# Patient Record
Sex: MALE | Race: OTHER | HISPANIC OR LATINO
[De-identification: names, ages, dates, MRNs, and addresses within clinical notes are randomized per-mention and may not be internally consistent; named-entity substitution may affect disease eponyms.]

---

## 2017-01-26 ENCOUNTER — APPOINTMENT (OUTPATIENT)
Dept: ORTHOPEDIC SURGERY | Facility: CLINIC | Age: 57
End: 2017-01-26

## 2017-01-26 VITALS — BODY MASS INDEX: 29.57 KG/M2 | HEIGHT: 66 IN | WEIGHT: 184 LBS

## 2017-01-26 DIAGNOSIS — M75.41 IMPINGEMENT SYNDROME OF RIGHT SHOULDER: ICD-10-CM

## 2017-01-26 DIAGNOSIS — M75.42 IMPINGEMENT SYNDROME OF LEFT SHOULDER: ICD-10-CM

## 2017-03-31 ENCOUNTER — RX RENEWAL (OUTPATIENT)
Age: 57
End: 2017-03-31

## 2017-06-19 ENCOUNTER — RX RENEWAL (OUTPATIENT)
Age: 57
End: 2017-06-19

## 2017-06-20 ENCOUNTER — RX RENEWAL (OUTPATIENT)
Age: 57
End: 2017-06-20

## 2017-08-29 ENCOUNTER — RX RENEWAL (OUTPATIENT)
Age: 57
End: 2017-08-29

## 2017-09-27 ENCOUNTER — RX RENEWAL (OUTPATIENT)
Age: 57
End: 2017-09-27

## 2017-10-02 ENCOUNTER — RX RENEWAL (OUTPATIENT)
Age: 57
End: 2017-10-02

## 2017-10-30 ENCOUNTER — RX RENEWAL (OUTPATIENT)
Age: 57
End: 2017-10-30

## 2018-01-23 ENCOUNTER — RX RENEWAL (OUTPATIENT)
Age: 58
End: 2018-01-23

## 2018-02-01 ENCOUNTER — OUTPATIENT (OUTPATIENT)
Dept: OUTPATIENT SERVICES | Facility: HOSPITAL | Age: 58
LOS: 1 days | End: 2018-02-01
Payer: MEDICAID

## 2018-02-01 ENCOUNTER — APPOINTMENT (OUTPATIENT)
Dept: INTERNAL MEDICINE | Facility: CLINIC | Age: 58
End: 2018-02-01
Payer: MEDICAID

## 2018-02-01 VITALS
HEART RATE: 116 BPM | HEIGHT: 66 IN | SYSTOLIC BLOOD PRESSURE: 140 MMHG | BODY MASS INDEX: 27.32 KG/M2 | WEIGHT: 170 LBS | DIASTOLIC BLOOD PRESSURE: 70 MMHG | OXYGEN SATURATION: 98 %

## 2018-02-01 VITALS — HEART RATE: 92 BPM

## 2018-02-01 DIAGNOSIS — I10 ESSENTIAL (PRIMARY) HYPERTENSION: ICD-10-CM

## 2018-02-01 DIAGNOSIS — D64.9 ANEMIA, UNSPECIFIED: ICD-10-CM

## 2018-02-01 PROCEDURE — 99396 PREV VISIT EST AGE 40-64: CPT

## 2018-02-01 PROCEDURE — G0463: CPT

## 2018-02-02 LAB
25(OH)D3 SERPL-MCNC: 24 NG/ML
ALBUMIN SERPL ELPH-MCNC: 4.7 G/DL
ALP BLD-CCNC: 93 U/L
ALT SERPL-CCNC: 23 U/L
ANION GAP SERPL CALC-SCNC: 16 MMOL/L
AST SERPL-CCNC: 23 U/L
BASOPHILS # BLD AUTO: 0.02 K/UL
BASOPHILS NFR BLD AUTO: 0.3 %
BILIRUB SERPL-MCNC: 0.3 MG/DL
BUN SERPL-MCNC: 22 MG/DL
CALCIUM SERPL-MCNC: 10.2 MG/DL
CHLORIDE SERPL-SCNC: 99 MMOL/L
CHOLEST SERPL-MCNC: 183 MG/DL
CHOLEST/HDLC SERPL: 2.1 RATIO
CO2 SERPL-SCNC: 23 MMOL/L
CREAT SERPL-MCNC: 0.9 MG/DL
EOSINOPHIL # BLD AUTO: 0.19 K/UL
EOSINOPHIL NFR BLD AUTO: 3.2 %
FOLATE SERPL-MCNC: 11.1 NG/ML
GLUCOSE SERPL-MCNC: 128 MG/DL
HBA1C MFR BLD HPLC: 5.5 %
HCT VFR BLD CALC: 39 %
HDLC SERPL-MCNC: 86 MG/DL
HGB BLD-MCNC: 12.5 G/DL
HIV1+2 AB SPEC QL IA.RAPID: NONREACTIVE
IMM GRANULOCYTES NFR BLD AUTO: 0.2 %
IRON SATN MFR SERPL: 32 %
IRON SERPL-MCNC: 108 UG/DL
LDLC SERPL CALC-MCNC: 79 MG/DL
LYMPHOCYTES # BLD AUTO: 1.17 K/UL
LYMPHOCYTES NFR BLD AUTO: 19.6 %
MAN DIFF?: NORMAL
MCHC RBC-ENTMCNC: 28.7 PG
MCHC RBC-ENTMCNC: 32.1 GM/DL
MCV RBC AUTO: 89.7 FL
MONOCYTES # BLD AUTO: 0.51 K/UL
MONOCYTES NFR BLD AUTO: 8.5 %
NEUTROPHILS # BLD AUTO: 4.08 K/UL
NEUTROPHILS NFR BLD AUTO: 68.2 %
PLATELET # BLD AUTO: 209 K/UL
POTASSIUM SERPL-SCNC: 4.5 MMOL/L
PROT SERPL-MCNC: 7.8 G/DL
RBC # BLD: 4.35 M/UL
RBC # FLD: 13.1 %
SODIUM SERPL-SCNC: 138 MMOL/L
T PALLIDUM AB SER QL IA: NEGATIVE
TIBC SERPL-MCNC: 336 UG/DL
TRIGL SERPL-MCNC: 92 MG/DL
TSH SERPL-ACNC: 1.32 UIU/ML
UIBC SERPL-MCNC: 228 UG/DL
VIT B12 SERPL-MCNC: 369 PG/ML
WBC # FLD AUTO: 5.98 K/UL

## 2018-02-12 DIAGNOSIS — D64.9 ANEMIA, UNSPECIFIED: ICD-10-CM

## 2018-04-17 ENCOUNTER — RX RENEWAL (OUTPATIENT)
Age: 58
End: 2018-04-17

## 2018-04-30 ENCOUNTER — APPOINTMENT (OUTPATIENT)
Dept: PULMONOLOGY | Facility: CLINIC | Age: 58
End: 2018-04-30
Payer: MEDICAID

## 2018-04-30 VITALS
TEMPERATURE: 98.4 F | HEIGHT: 66 IN | HEART RATE: 85 BPM | OXYGEN SATURATION: 97 % | WEIGHT: 169 LBS | BODY MASS INDEX: 27.16 KG/M2 | RESPIRATION RATE: 16 BRPM | SYSTOLIC BLOOD PRESSURE: 120 MMHG | DIASTOLIC BLOOD PRESSURE: 79 MMHG

## 2018-04-30 PROCEDURE — 99204 OFFICE O/P NEW MOD 45 MIN: CPT | Mod: GC

## 2018-05-16 ENCOUNTER — OUTPATIENT (OUTPATIENT)
Dept: OUTPATIENT SERVICES | Facility: HOSPITAL | Age: 58
LOS: 1 days | End: 2018-05-16
Payer: MEDICAID

## 2018-05-16 ENCOUNTER — APPOINTMENT (OUTPATIENT)
Dept: SLEEP CENTER | Facility: CLINIC | Age: 58
End: 2018-05-16
Payer: MEDICAID

## 2018-05-16 PROCEDURE — 95806 SLEEP STUDY UNATT&RESP EFFT: CPT | Mod: 26

## 2018-05-16 PROCEDURE — 95806 SLEEP STUDY UNATT&RESP EFFT: CPT

## 2018-05-18 ENCOUNTER — RESULT REVIEW (OUTPATIENT)
Age: 58
End: 2018-05-18

## 2018-05-18 DIAGNOSIS — G47.33 OBSTRUCTIVE SLEEP APNEA (ADULT) (PEDIATRIC): ICD-10-CM

## 2018-05-29 ENCOUNTER — RESULT REVIEW (OUTPATIENT)
Age: 58
End: 2018-05-29

## 2018-06-18 ENCOUNTER — OUTPATIENT (OUTPATIENT)
Dept: OUTPATIENT SERVICES | Facility: HOSPITAL | Age: 58
LOS: 1 days | End: 2018-06-18
Payer: MEDICAID

## 2018-06-18 ENCOUNTER — APPOINTMENT (OUTPATIENT)
Dept: INTERNAL MEDICINE | Facility: CLINIC | Age: 58
End: 2018-06-18
Payer: MEDICAID

## 2018-06-18 ENCOUNTER — LABORATORY RESULT (OUTPATIENT)
Age: 58
End: 2018-06-18

## 2018-06-18 VITALS
WEIGHT: 170 LBS | BODY MASS INDEX: 27.32 KG/M2 | HEART RATE: 96 BPM | DIASTOLIC BLOOD PRESSURE: 90 MMHG | SYSTOLIC BLOOD PRESSURE: 130 MMHG | HEIGHT: 66 IN

## 2018-06-18 DIAGNOSIS — I10 ESSENTIAL (PRIMARY) HYPERTENSION: ICD-10-CM

## 2018-06-18 LAB — HBA1C BLD-MCNC: 5.7 % — HIGH (ref 4–5.6)

## 2018-06-18 PROCEDURE — G0463: CPT

## 2018-06-18 PROCEDURE — 82043 UR ALBUMIN QUANTITATIVE: CPT

## 2018-06-18 PROCEDURE — 99213 OFFICE O/P EST LOW 20 MIN: CPT | Mod: GE

## 2018-06-18 PROCEDURE — 83036 HEMOGLOBIN GLYCOSYLATED A1C: CPT

## 2018-06-18 NOTE — ASSESSMENT
[FreeTextEntry1] : 58yo M with history of HTN, T2DM, newly dx RADHA, presents for diabetes follow up. \par \par DMT2 - Last HgA1c at 5.5 in 2/2018.\par - f/u A1c today. Discussed discontinuing metformin if A1c continues to decrease.\par - cont metformin 500 BID for now\par - completed diabetic foot exam today - nml monofilament test\par - f/u annual microalbuminuria\par - will get annual eye exam\par \par RADHA - pending f/u APAP titration at home\par \par HTN\par - BP well controlled.\par - cont Lisinopril 5mg qd\par \par HLD \par - start atorvastatin 40mg qd as pt with ASCVD score of 10%\par - counseled on regular exercise for 30m every 3-5days\par - encouraged continued weight loss through healthy eating\par \par HCM \par colonoscopy neg in  2/2014\par Depression screen - PHQ2 0\par imm- tdap 2015, pneumovax 2015\par \par f/u in 3 months \par d/w Dr Sarkar\par \par

## 2018-06-18 NOTE — REVIEW OF SYSTEMS
[Fever] : no fever [Chills] : no chills [Fatigue] : no fatigue [Chest Pain] : no chest pain [Palpitations] : no palpitations [Lower Ext Edema] : no lower extremity edema [Shortness Of Breath] : no shortness of breath [Wheezing] : no wheezing [Dyspnea on Exertion] : not dyspnea on exertion [Abdominal Pain] : no abdominal pain [Nausea] : no nausea [Constipation] : no constipation [Diarrhea] : no diarrhea [Dysuria] : no dysuria [Incontinence] : no incontinence [Joint Pain] : no joint pain [Joint Stiffness] : no joint stiffness [Muscle Pain] : no muscle pain [Back Pain] : no back pain [Joint Swelling] : no joint swelling [Headache] : no headache [Dizziness] : no dizziness [Unsteady Walk] : no ataxia

## 2018-06-18 NOTE — PHYSICAL EXAM
[No Acute Distress] : no acute distress [Well Nourished] : well nourished [Well Developed] : well developed [Well-Appearing] : well-appearing [Normal Sclera/Conjunctiva] : normal sclera/conjunctiva [PERRL] : pupils equal round and reactive to light [EOMI] : extraocular movements intact [No JVD] : no jugular venous distention [Supple] : supple [No Lymphadenopathy] : no lymphadenopathy [Thyroid Normal, No Nodules] : the thyroid was normal and there were no nodules present [No Respiratory Distress] : no respiratory distress  [Clear to Auscultation] : lungs were clear to auscultation bilaterally [No Accessory Muscle Use] : no accessory muscle use [Normal Rate] : normal rate  [Regular Rhythm] : with a regular rhythm [Normal S1, S2] : normal S1 and S2 [No Murmur] : no murmur heard [Pedal Pulses Present] : the pedal pulses are present [No Edema] : there was no peripheral edema [No Extremity Clubbing/Cyanosis] : no extremity clubbing/cyanosis [Soft] : abdomen soft [Non Tender] : non-tender [Non-distended] : non-distended [No Masses] : no abdominal mass palpated [No HSM] : no HSM [Normal Bowel Sounds] : normal bowel sounds [Normal Supraclavicular Nodes] : no supraclavicular lymphadenopathy [Normal Posterior Cervical Nodes] : no posterior cervical lymphadenopathy [Normal Anterior Cervical Nodes] : no anterior cervical lymphadenopathy [No CVA Tenderness] : no CVA  tenderness [No Spinal Tenderness] : no spinal tenderness [No Joint Swelling] : no joint swelling [Grossly Normal Strength/Tone] : grossly normal strength/tone [No Rash] : no rash [Normal Gait] : normal gait [Coordination Grossly Intact] : coordination grossly intact [No Focal Deficits] : no focal deficits [Normal Affect] : the affect was normal [Normal Insight/Judgement] : insight and judgment were intact [Comprehensive Foot Exam Normal] : Right and left foot were examined and both feet are normal. No ulcers in either foot. Toes are normal and with full ROM.  Normal tactile sensation with monofilament testing throughout both feet

## 2018-06-18 NOTE — HISTORY OF PRESENT ILLNESS
[FreeTextEntry1] : diabetes follow up [de-identified] : 56yo M with history of HTN, T2DM, newly dx RADHA, presents for diabetes follow up. \par \par DMT2 - Tolerating metformin 500 BID. Last A1c in 2/2018 at 5.5. Reports continued healthy eating, limiting carbohydrates and low fat diet. Has lost 15 lbs over past 1.5 yr. Reports exercising 30-60m/d. Reports feeling at a healthy weight. No sx of dysuria, dizziness, n/v. Reports regular annual eye exams. \par RADHA - Recently had sleep study and dx with RADHA, pending f/u APAP titration at home. \par HTN - Doing well with Lisinopril. \par HLD - currently on simvastatin 20mg qd. ASCVD risk score nearly 10%. Never tried lipitor or rosuvastatin.\par \par HCM \par Depression screening phq2 score of 0\par colonoscopy neg in 2/2014\par imm- tdap 2015, pneumovax 2015

## 2018-06-19 LAB
CREAT ?TM UR-MCNC: 187 MG/DL — SIGNIFICANT CHANGE UP
MICROALBUMIN UR-MCNC: 4.8 MG/DL — SIGNIFICANT CHANGE UP
MICROALBUMIN/CREAT UR-RTO: 26 MG/G — SIGNIFICANT CHANGE UP (ref 0–30)

## 2018-06-22 DIAGNOSIS — G47.33 OBSTRUCTIVE SLEEP APNEA (ADULT) (PEDIATRIC): ICD-10-CM

## 2018-06-22 DIAGNOSIS — E78.5 HYPERLIPIDEMIA, UNSPECIFIED: ICD-10-CM

## 2018-06-22 DIAGNOSIS — E11.9 TYPE 2 DIABETES MELLITUS WITHOUT COMPLICATIONS: ICD-10-CM

## 2018-07-27 ENCOUNTER — APPOINTMENT (OUTPATIENT)
Dept: INTERNAL MEDICINE | Facility: CLINIC | Age: 58
End: 2018-07-27
Payer: MEDICAID

## 2018-07-27 ENCOUNTER — OUTPATIENT (OUTPATIENT)
Dept: OUTPATIENT SERVICES | Facility: HOSPITAL | Age: 58
LOS: 1 days | End: 2018-07-27
Payer: MEDICAID

## 2018-07-27 VITALS — BODY MASS INDEX: 27.12 KG/M2 | SYSTOLIC BLOOD PRESSURE: 123 MMHG | DIASTOLIC BLOOD PRESSURE: 87 MMHG | WEIGHT: 168 LBS

## 2018-07-27 DIAGNOSIS — I10 ESSENTIAL (PRIMARY) HYPERTENSION: ICD-10-CM

## 2018-07-27 PROCEDURE — 99214 OFFICE O/P EST MOD 30 MIN: CPT | Mod: GC

## 2018-07-27 PROCEDURE — G0463: CPT

## 2018-08-10 DIAGNOSIS — G47.33 OBSTRUCTIVE SLEEP APNEA (ADULT) (PEDIATRIC): ICD-10-CM

## 2018-08-10 DIAGNOSIS — J30.9 ALLERGIC RHINITIS, UNSPECIFIED: ICD-10-CM

## 2018-08-11 NOTE — HISTORY OF PRESENT ILLNESS
[FreeTextEntry1] : head pressure [de-identified] : 57yo M with history of HTN, T2DM, newly dx RADHA, presents for acute visit c/o head pressure and nasal congestion. \par \par He reports for the past 3-4 weeks, he feels a pressure in the frontal and temporal regions of his head. Reports it is worse in the AM. He believes it started after he cleaned his basement. Also reports nasal congestion, and itching above the roof of mouth. Hes had some relief with excedrin and much improvedment with hydroxyzine 25mg, taken from wife. Denies sick contacts, no new animal contact, no hx of allergies. Denies fevers, chills, n/v/d, cough, dyspnea, rhinorrhea, h/a. \par \par Of note, still awaiting set up for home sleep study as rx'd by pulm.

## 2018-08-11 NOTE — PHYSICAL EXAM
[No Acute Distress] : no acute distress [Well Nourished] : well nourished [Well Developed] : well developed [Well-Appearing] : well-appearing [Normal Sclera/Conjunctiva] : normal sclera/conjunctiva [PERRL] : pupils equal round and reactive to light [EOMI] : extraocular movements intact [Normal Outer Ear/Nose] : the outer ears and nose were normal in appearance [Normal Oropharynx] : the oropharynx was normal [No JVD] : no jugular venous distention [Supple] : supple [No Lymphadenopathy] : no lymphadenopathy [No Respiratory Distress] : no respiratory distress  [Clear to Auscultation] : lungs were clear to auscultation bilaterally [No Accessory Muscle Use] : no accessory muscle use [Normal Rate] : normal rate  [Regular Rhythm] : with a regular rhythm [Normal S1, S2] : normal S1 and S2 [No Murmur] : no murmur heard [No Edema] : there was no peripheral edema [Normal Supraclavicular Nodes] : no supraclavicular lymphadenopathy [Normal Posterior Cervical Nodes] : no posterior cervical lymphadenopathy [Normal Anterior Cervical Nodes] : no anterior cervical lymphadenopathy [Grossly Normal Strength/Tone] : grossly normal strength/tone [No Rash] : no rash [Coordination Grossly Intact] : coordination grossly intact [No Focal Deficits] : no focal deficits [Normal Affect] : the affect was normal [Normal Insight/Judgement] : insight and judgment were intact [de-identified] : erythematous, injected oropharynx; mild bulging of R typanic membrane without effusion or drainage

## 2018-08-11 NOTE — ASSESSMENT
[FreeTextEntry1] : 59yo M with hx of HTN, T2DM, newly dx RADHA, presents for acute visit c/o head pressure and nasal congestion. \par \par #head pressure and nasal congestion 2/2 allergic rhinitis - Responded to anti histamines and resolving.\par - start benadryl 50mg qhs, nasal saline drops in AM, nasal flonase\par - recommended to wait a few hours between saline spray and flonase\par - will f/u in 5 weeks\par \par #RADHA - Also ikely contributing to head pressure. Still pending home APAP as ordered by pulm; insurance did not cover sleep study. Was contacted this week by pulm to set up APAP.\par - Will f/u in 5 weeks to ensure started machine\par \par f/u in 5 weeks\par d/w Dr Stone

## 2018-08-11 NOTE — REVIEW OF SYSTEMS
[Fever] : no fever [Chills] : no chills [Fatigue] : no fatigue [Night Sweats] : no night sweats [Discharge] : no discharge [Pain] : no pain [Vision Problems] : no vision problems [Itching] : no itching [Earache] : no earache [Hearing Loss] : no hearing loss [Postnasal Drip] : no postnasal drip [Nasal Discharge] : no nasal discharge [Sore Throat] : no sore throat [Chest Pain] : no chest pain [Palpitations] : no palpitations [Lower Ext Edema] : no lower extremity edema [Shortness Of Breath] : no shortness of breath [Wheezing] : no wheezing [Cough] : no cough [Abdominal Pain] : no abdominal pain [Diarrhea] : no diarrhea [Vomiting] : no vomiting [Joint Pain] : no joint pain [Back Pain] : no back pain [Headache] : no headache [Fainting] : no fainting [Memory Loss] : no memory loss

## 2018-08-14 ENCOUNTER — APPOINTMENT (OUTPATIENT)
Dept: INTERNAL MEDICINE | Facility: CLINIC | Age: 58
End: 2018-08-14
Payer: MEDICAID

## 2018-08-14 ENCOUNTER — OUTPATIENT (OUTPATIENT)
Dept: OUTPATIENT SERVICES | Facility: HOSPITAL | Age: 58
LOS: 1 days | End: 2018-08-14
Payer: MEDICAID

## 2018-08-14 VITALS
HEIGHT: 66 IN | SYSTOLIC BLOOD PRESSURE: 100 MMHG | BODY MASS INDEX: 27.32 KG/M2 | WEIGHT: 170 LBS | DIASTOLIC BLOOD PRESSURE: 70 MMHG

## 2018-08-14 VITALS — DIASTOLIC BLOOD PRESSURE: 80 MMHG | SYSTOLIC BLOOD PRESSURE: 122 MMHG

## 2018-08-14 DIAGNOSIS — I10 ESSENTIAL (PRIMARY) HYPERTENSION: ICD-10-CM

## 2018-08-14 PROCEDURE — 99213 OFFICE O/P EST LOW 20 MIN: CPT | Mod: GE

## 2018-08-14 PROCEDURE — G0463: CPT

## 2018-08-14 NOTE — PHYSICAL EXAM
[No Acute Distress] : no acute distress [Normal Outer Ear/Nose] : the outer ears and nose were normal in appearance [Normal Oropharynx] : the oropharynx was normal [No Respiratory Distress] : no respiratory distress  [Clear to Auscultation] : lungs were clear to auscultation bilaterally [Normal Rate] : normal rate  [Regular Rhythm] : with a regular rhythm [Normal S1, S2] : normal S1 and S2 [No Murmur] : no murmur heard [Normal Gait] : normal gait [Coordination Grossly Intact] : coordination grossly intact [No Focal Deficits] : no focal deficits [de-identified] : bilateral middle ear effusions

## 2018-08-14 NOTE — REVIEW OF SYSTEMS
[Nasal Discharge] : nasal discharge [Headache] : headache [Dizziness] : no dizziness [Unsteady Walk] : no ataxia [Negative] : Respiratory

## 2018-08-14 NOTE — ASSESSMENT
[FreeTextEntry1] : 59 y/o M with HTN, DM, recently dx'ed RADHA (pending CPAP titration study) here for acute visit for headache.\par \par Headache - suspect likely sinus congestion vs. untreated RADHA vs. tension headache. Does not have typical features of migraine or cluster headache. BP well controlled.\par - Will do trial of naproxen prn\par - Reduce caffeine intake\par - Continue flonase, nasal saline spray\par \par RADHA - pending CPAP titration\par - F/U with pulm\par \par RTC on 8/31 for regularly scheduled f/u.

## 2018-08-14 NOTE — HISTORY OF PRESENT ILLNESS
[FreeTextEntry8] :  #[id number]\par \par Seen here on 7/27 for headache, thought to be related to allergic rhinitis. Feels like "50% well" and wants to get checked.\par The nasal congestion has resolved. However, continues to have discomfort in the head, sometimes feels like pressure on the R side and in the middle or in the back. Happening on a daily basis -- lasts for about 2 hours. Last episode was yesterday morning for 30 minutes, started while having breakfast, pressure in the front of the head. Used his nasal spray. Resolved on its own. \par Can happen at any time of day, morning and nighttime.\par No dizziness. No blurry vision. No chest pain. No SOB. No nausea. \par Never had headaches before. \par In between episodes of headache, "feels like water in head" like a balloon. \par Able to go about his normal daily activities.\par Says feels like "he's floating" every day, mostly in the morning and at night.\par \par Recent dx of sleep apnea, but still pending CPAP titration study.\par \par Has been taking benadryl and flonase since last visit. Also using nasal saline. \par Occasionally takes tylenol OTC. No longer taking excedrin. \par \par HTN - taking lisinopril 5mg. Checks home BPs 2x/week - reports BPs have been in the 120s at home.

## 2018-08-15 ENCOUNTER — MOBILE ON CALL (OUTPATIENT)
Age: 58
End: 2018-08-15

## 2018-08-20 DIAGNOSIS — R51 HEADACHE: ICD-10-CM

## 2018-08-31 ENCOUNTER — APPOINTMENT (OUTPATIENT)
Dept: INTERNAL MEDICINE | Facility: CLINIC | Age: 58
End: 2018-08-31
Payer: MEDICAID

## 2018-08-31 ENCOUNTER — OUTPATIENT (OUTPATIENT)
Dept: OUTPATIENT SERVICES | Facility: HOSPITAL | Age: 58
LOS: 1 days | End: 2018-08-31
Payer: MEDICAID

## 2018-08-31 VITALS
OXYGEN SATURATION: 98 % | DIASTOLIC BLOOD PRESSURE: 83 MMHG | SYSTOLIC BLOOD PRESSURE: 120 MMHG | WEIGHT: 169 LBS | BODY MASS INDEX: 27.28 KG/M2 | HEART RATE: 87 BPM

## 2018-08-31 DIAGNOSIS — J30.9 ALLERGIC RHINITIS, UNSPECIFIED: ICD-10-CM

## 2018-08-31 DIAGNOSIS — I10 ESSENTIAL (PRIMARY) HYPERTENSION: ICD-10-CM

## 2018-08-31 PROCEDURE — G0463: CPT

## 2018-08-31 PROCEDURE — 99215 OFFICE O/P EST HI 40 MIN: CPT | Mod: GC

## 2018-08-31 RX ORDER — CAMPHOR 0.45 %
25 GEL (GRAM) TOPICAL
Qty: 30 | Refills: 0 | Status: DISCONTINUED | COMMUNITY
Start: 2018-07-27 | End: 2018-08-31

## 2018-08-31 RX ORDER — FLUTICASONE PROPIONATE 50 UG/1
50 SPRAY, METERED NASAL DAILY
Qty: 1 | Refills: 3 | Status: DISCONTINUED | COMMUNITY
Start: 2018-07-27 | End: 2018-08-31

## 2018-08-31 RX ORDER — NAPROXEN 500 MG/1
500 TABLET ORAL
Qty: 10 | Refills: 0 | Status: DISCONTINUED | COMMUNITY
Start: 2018-08-14 | End: 2018-08-31

## 2018-09-01 PROBLEM — J30.9 ALLERGIC RHINITIS, UNSPECIFIED SEASONALITY, UNSPECIFIED TRIGGER: Status: ACTIVE | Noted: 2018-07-27

## 2018-09-01 NOTE — PHYSICAL EXAM
[No Acute Distress] : no acute distress [Well Nourished] : well nourished [Well Developed] : well developed [Well-Appearing] : well-appearing [Normal Sclera/Conjunctiva] : normal sclera/conjunctiva [PERRL] : pupils equal round and reactive to light [EOMI] : extraocular movements intact [Normal Outer Ear/Nose] : the outer ears and nose were normal in appearance [Normal Oropharynx] : the oropharynx was normal [No JVD] : no jugular venous distention [Supple] : supple [No Lymphadenopathy] : no lymphadenopathy [No Respiratory Distress] : no respiratory distress  [Clear to Auscultation] : lungs were clear to auscultation bilaterally [No Accessory Muscle Use] : no accessory muscle use [Normal Rate] : normal rate  [Regular Rhythm] : with a regular rhythm [Normal S1, S2] : normal S1 and S2 [No Murmur] : no murmur heard [No Varicosities] : no varicosities [Pedal Pulses Present] : the pedal pulses are present [No Edema] : there was no peripheral edema [No Extremity Clubbing/Cyanosis] : no extremity clubbing/cyanosis [Soft] : abdomen soft [Non Tender] : non-tender [Non-distended] : non-distended [No Masses] : no abdominal mass palpated [No HSM] : no HSM [Normal Bowel Sounds] : normal bowel sounds [Normal Supraclavicular Nodes] : no supraclavicular lymphadenopathy [Normal Posterior Cervical Nodes] : no posterior cervical lymphadenopathy [Normal Anterior Cervical Nodes] : no anterior cervical lymphadenopathy [No CVA Tenderness] : no CVA  tenderness [No Spinal Tenderness] : no spinal tenderness [No Joint Swelling] : no joint swelling [Grossly Normal Strength/Tone] : grossly normal strength/tone [No Rash] : no rash [Normal Gait] : normal gait [Coordination Grossly Intact] : coordination grossly intact [No Focal Deficits] : no focal deficits [Normal Affect] : the affect was normal [Normal Insight/Judgement] : insight and judgment were intact

## 2018-09-04 NOTE — ASSESSMENT
[FreeTextEntry1] : 57yo M with history of HTN, T2DM, newly dx RADHA, presents for f/u for headaches\par \par #H/a - Initially with nasal rhinitis and sinus congestion, since resolved, now with h/a associated with depression sx.\par - treat for depression, start escitalopram 5mg qd\par - d/w side efx of nausea and worsening SIs\par - will f/u in 2 weeks for re-eval\par - would like to start med and then discuss counseling at next visit\par \par #Depression - PHQ9 at 15. \par - plan as above\par \par #nasal congestion - resolved\par \par #RADHA -\par  cont CPAP qhs\par \par HCM \par colonoscopy neg in 2/2014\par imm- tdap 2015, pneumovax 2015 \par \par f/u in 2 weeks for depression\par d/w Dr Barba

## 2018-09-04 NOTE — REVIEW OF SYSTEMS
[Depression] : depression [Fever] : no fever [Chills] : no chills [Fatigue] : no fatigue [Postnasal Drip] : no postnasal drip [Nasal Discharge] : no nasal discharge [Sore Throat] : no sore throat [Chest Pain] : no chest pain [Palpitations] : no palpitations [Lower Ext Edema] : no lower extremity edema [Abdominal Pain] : no abdominal pain [Nausea] : no nausea [Vomiting] : no vomiting [Heartburn] : no heartburn [Dysuria] : no dysuria [Hematuria] : no hematuria [Frequency] : no frequency [Joint Pain] : no joint pain [Back Pain] : no back pain [Joint Swelling] : no joint swelling [Suicidal] : not suicidal [Insomnia] : no insomnia [Anxiety] : no anxiety

## 2018-09-04 NOTE — HEALTH RISK ASSESSMENT
[1] : 1) Little interest or pleasure doing things for several days (1) [3] : 2) Feeling down, depressed, or hopeless for nearly every day (3) [[id number]] : 4 [[id number]] : 15

## 2018-09-04 NOTE — HISTORY OF PRESENT ILLNESS
[FreeTextEntry1] : h/a [de-identified] : 57yo M with history of HTN, T2DM, newly dx RADHA, presents for f/u for headaches\par \par #H/a - described as discomfort, can occur all over, now feels in the back of head, and now in the neck. Believes in the past he felt a pressure now he feels more of a stress. Having difficulty at home and financial stressors. Reports difficulty sleeping. Was tried on naproxen for 8 days and felt it did not help.\par \par #Depression - Reports new neck pain "like a gorilla on my shoulders". Endorses depression. PHQ9 at 15. Endorses stress at home and financially. Denies SI/HI/AH. Does not have a plan. Eating well. Unable to get quality sleep. Family is support system. Would like to use medications and talk to counselor.\par \par #nasal congestion - resolved with flonase, no longer requires it. Continues with saline nasal soln. \par \par #RADHA - started CPAP machine. Tolerating well. Sleeping 6-7 hr/night. \par \par HCM \par colonoscopy neg in 2/2014\par imm- tdap 2015, pneumovax 2015

## 2018-09-10 ENCOUNTER — EMERGENCY (EMERGENCY)
Facility: HOSPITAL | Age: 58
LOS: 1 days | Discharge: HOME | End: 2018-09-10
Attending: EMERGENCY MEDICINE
Payer: MEDICAID

## 2018-09-10 VITALS
DIASTOLIC BLOOD PRESSURE: 84 MMHG | WEIGHT: 169.09 LBS | TEMPERATURE: 99 F | SYSTOLIC BLOOD PRESSURE: 143 MMHG | HEIGHT: 66 IN | RESPIRATION RATE: 18 BRPM | HEART RATE: 81 BPM | OXYGEN SATURATION: 98 %

## 2018-09-10 PROCEDURE — 99284 EMERGENCY DEPT VISIT MOD MDM: CPT | Mod: 25

## 2018-09-11 VITALS
DIASTOLIC BLOOD PRESSURE: 80 MMHG | SYSTOLIC BLOOD PRESSURE: 138 MMHG | OXYGEN SATURATION: 100 % | HEART RATE: 78 BPM | RESPIRATION RATE: 18 BRPM | TEMPERATURE: 98 F

## 2018-09-11 PROCEDURE — 70450 CT HEAD/BRAIN W/O DYE: CPT

## 2018-09-11 PROCEDURE — 70450 CT HEAD/BRAIN W/O DYE: CPT | Mod: 26

## 2018-09-11 PROCEDURE — 99284 EMERGENCY DEPT VISIT MOD MDM: CPT

## 2018-09-11 NOTE — ED PROVIDER NOTE - PLAN OF CARE
1. Take Melatonin 5 mg nightly for sleep aid  2. Please call Dr. Corrales tomorrow to schedule appointment for follow up.  3. Stop taking Lexapro until you speak with your doctor about other options.  4. Return to the emergency department for nausea/vomiting, changes in vision, thoughts of hurting yourself or others or any other concerns. 1. Take Melatonin 5 mg nightly for sleep aid  2. Please call Dr. Corrales tomorrow to schedule appointment for follow up.  3. Stop taking Lexapro until you speak with your doctor about other options.  4. Return to the emergency department for nausea/vomiting, changes in vision, thoughts of hurting yourself or others or any other concerns.    Psychiatry [phone]

## 2018-09-11 NOTE — ED PROVIDER NOTE - CARE PLAN
Principal Discharge DX:	Insomnia  Assessment and plan of treatment:	1. Take Melatonin 5 mg nightly for sleep aid  2. Please call Dr. Corrales tomorrow to schedule appointment for follow up.  3. Stop taking Lexapro until you speak with your doctor about other options.  4. Return to the emergency department for nausea/vomiting, changes in vision, thoughts of hurting yourself or others or any other concerns. Principal Discharge DX:	Insomnia  Assessment and plan of treatment:	1. Take Melatonin 5 mg nightly for sleep aid  2. Please call Dr. Corrales tomorrow to schedule appointment for follow up.  3. Stop taking Lexapro until you speak with your doctor about other options.  4. Return to the emergency department for nausea/vomiting, changes in vision, thoughts of hurting yourself or others or any other concerns.    Psychiatry [phone]

## 2018-09-11 NOTE — ED PROVIDER NOTE - ATTENDING CONTRIBUTION TO CARE
59 y/o M hx of HTN, DM, HLD, RADHA on CPAP, recently diagnosed MDD presents with headache & insomnia x 4 days. Pt has been experiencing intermittent gen headache x 2 mo, also with feelings of sleep disturbances and anhedonia, was first dx with sinusitis, then with depression, and started on lexapro 1 week ago. since starting lexapro with gradually worsening headache and diff sleeping. headache gen, pressure, moderate, currently 3/10, "worse because I can't sleep." denies photophobia, numbness, weakness, neck pain, dizziness, vision changes. denies cp, sob, n/v/d, palpitations, f/c. Pt denies SI/HI, hallucinations, feels safe.    PE: NAD, NCAT, MMM, Trachea midline, Normal conjunctiva, lungs CTAB, S1/S2 RRR, Normal perfusion, 2+ radial pulses bilat, Abdomen Soft, NTND, No rebound/guarding, No LE edema, No deformity of extremities, No rashes,  No focal motor or sensory deficits. CN II-XII intact. Visual fields intact. EOMI, PERRLA. Facial sensation equal bilat. Smile and eye closure equal bilat. Hearing intact bilat. Palate elevation equal, tongue protrusion midline. Lateral head rotation equal bilat. 5/5 strength UE and LE bilat. Sensation grossly intact. No pronator drift. Normal finger to nose. Negative Romberg. Normal gait.     Pt well appearing. VS without sig abnormality. In-depth neuro exam wnl. Headache likely complication of recent insomnia, low suspicion cns pathology, however discussed risks/benefits of ct head with patient and he would like to have performed. Headache mild, does not want analgesia. Re-eassess s/p imaging. - Keith Yu MD

## 2018-09-11 NOTE — ED ADULT NURSE NOTE - NSIMPLEMENTINTERV_GEN_ALL_ED
Implemented All Universal Safety Interventions:  [place] to call system. Call bell, personal items and telephone within reach. Instruct patient to call for assistance. Room bathroom lighting operational. Non-slip footwear when patient is off stretcher. Physically safe environment: no spills, clutter or unnecessary equipment. Stretcher in lowest position, wheels locked, appropriate side rails in place.

## 2018-09-11 NOTE — ED PROVIDER NOTE - OBJECTIVE STATEMENT
59 y/o M hx of HTN, DM, HLD, recently diagnosed MDD presents with headache & insomnia x 4 days.    Patient states that he developed generalized headache and sleep disturbances (nightmares) two months ago and since has been diagnosed with sinusitis (prescribed fluticasone spray) and later MDD by his PMD. He describes feelings of anhedonia, sleep disturbance but denies any active SI/HI or inpatient admissions for psychiatric disorders. Patient is concerned that over last four nights has been unable to sleep since starting Lexapro 1 week prior accompanied by generalized loss of appetite. He notes headache is a generalized pressure without focality, photophobia, vision changes, trauma, fevers/chills or neck stiffness. No overt nausea or vomiting. Has not obtained any outpatient imaging.     PMD: Dr. Corrales 59 y/o M hx of HTN, DM, HLD, RADHA on CPAP, recently diagnosed MDD presents with headache & insomnia x 4 days.    Patient states that he developed generalized headache and sleep disturbances (nightmares) two months ago and since has been diagnosed with sinusitis (prescribed fluticasone spray) and later MDD by his PMD. He describes feelings of anhedonia, sleep disturbance but denies any active SI/HI or inpatient admissions for psychiatric disorders. Patient is concerned that over last four nights has been unable to sleep since starting Lexapro 1 week prior accompanied by generalized loss of appetite. He notes headache is a generalized pressure without focality, photophobia, vision changes, trauma, fevers/chills or neck stiffness. No overt nausea or vomiting. Has not obtained any outpatient imaging.     PMD: Dr. Corrales

## 2018-09-11 NOTE — ED ADULT NURSE NOTE - OBJECTIVE STATEMENT
58y male with hx of depression presents to the ER c/o ha x 1 week. pt is alert and oriented x 4 and speaking coherently. Pt states he recently started lexapro x 1 week and has not been able to sleep. pt states he has had several episodes of insomnia, including worsening headaches. Pt in nad, denies cp, sob, photophobia. neuro and sensory intact. md jones completed. family at the bedside. will reassess.

## 2018-09-11 NOTE — ED PROVIDER NOTE - MEDICAL DECISION MAKING DETAILS
59 y/o M hx of HTN, DM, HLD, recently diagnosed MDD presents with headache & insomnia x 4 days in the setting of recently starting Lexapro. Likely adverse effect of SSRI initiation. No focal neurologic deficits on exam. Will obtain CTH to r/o organic cause of sleep disturbances with persistent headaches, psychiatry follow up & recommend melatonin for sleep aid. 57 y/o M hx of HTN, DM, HLD, RADHA on CPAP, recently diagnosed MDD presents with headache & insomnia x 4 days in the setting of recently starting Lexapro. Likely adverse effect of SSRI initiation. No focal neurologic deficits on exam. Will obtain CTH to r/o organic cause of sleep disturbances with persistent headaches, psychiatry follow up & recommend melatonin for sleep aid.

## 2018-09-11 NOTE — ED PROVIDER NOTE - PHYSICAL EXAMINATION
GENERAL: no acute distress; well-developed  HEAD:  Atraumatic, Normocephalic  EYES: EOMI, PERRLA, conjunctiva and sclera clear  ENT: MMM; oropharynx clear  NECK: Supple, No JVD. No meningismus.   CHEST/LUNG: Clear to auscultation bilaterally; No wheeze  HEART: Regular rate and rhythm; No murmurs, rubs, or gallops  ABDOMEN: Soft, Nontender, Nondistended; Bowel sounds present  EXTREMITIES:  2+ Peripheral Pulses, No clubbing, cyanosis, or edema  PSYCH: AAOx3  NEUROLOGY: no focal motor or sensory deficits. 5/5 muscle strength in all extremities. Gait normal. CN II-XII grossly intact. Normal finger to nose. Negative Pronator drift.   SKIN: No rashes or lesions

## 2018-09-12 NOTE — DISCUSSION/SUMMARY
[Specialty: _____] : Specialty: [unfilled] [ER: _____] : ER: [unfilled] [ED] : a call from ED [Follow Up Appointment] : follow up appointment with provider [FreeTextEntry1] : Headache and Insomnia  [FreeTextEntry3] :  1. Take Melatonin 5 mg nightly for sleep aid\par 2. Please call Dr. Corrales tomorrow to schedule appointment for follow up.\par 3. Stop taking Lexapro until you speak with your doctor about other options.\par 4. Return to the emergency department for nausea/vomiting, changes in vision, thoughts of hurting yourself or others or any other concerns.\par \par Psychiatry [phone].

## 2018-09-13 DIAGNOSIS — R51 HEADACHE: ICD-10-CM

## 2018-09-13 DIAGNOSIS — J30.9 ALLERGIC RHINITIS, UNSPECIFIED: ICD-10-CM

## 2018-09-13 DIAGNOSIS — G47.33 OBSTRUCTIVE SLEEP APNEA (ADULT) (PEDIATRIC): ICD-10-CM

## 2018-09-13 DIAGNOSIS — F32.9 MAJOR DEPRESSIVE DISORDER, SINGLE EPISODE, UNSPECIFIED: ICD-10-CM

## 2018-09-19 ENCOUNTER — APPOINTMENT (OUTPATIENT)
Dept: INTERNAL MEDICINE | Facility: CLINIC | Age: 58
End: 2018-09-19

## 2018-10-05 ENCOUNTER — OUTPATIENT (OUTPATIENT)
Dept: OUTPATIENT SERVICES | Facility: HOSPITAL | Age: 58
LOS: 1 days | End: 2018-10-05
Payer: MEDICAID

## 2018-10-05 ENCOUNTER — APPOINTMENT (OUTPATIENT)
Dept: INTERNAL MEDICINE | Facility: CLINIC | Age: 58
End: 2018-10-05
Payer: MEDICAID

## 2018-10-05 ENCOUNTER — MED ADMIN CHARGE (OUTPATIENT)
Age: 58
End: 2018-10-05

## 2018-10-05 VITALS
SYSTOLIC BLOOD PRESSURE: 125 MMHG | DIASTOLIC BLOOD PRESSURE: 70 MMHG | WEIGHT: 162 LBS | BODY MASS INDEX: 26.15 KG/M2 | HEART RATE: 82 BPM | OXYGEN SATURATION: 98 %

## 2018-10-05 DIAGNOSIS — I10 ESSENTIAL (PRIMARY) HYPERTENSION: ICD-10-CM

## 2018-10-05 PROBLEM — F32.9 MAJOR DEPRESSIVE DISORDER, SINGLE EPISODE, UNSPECIFIED: Chronic | Status: ACTIVE | Noted: 2018-09-11

## 2018-10-05 PROCEDURE — 99213 OFFICE O/P EST LOW 20 MIN: CPT | Mod: GE

## 2018-10-05 PROCEDURE — G0463: CPT

## 2018-10-05 PROCEDURE — 90688 IIV4 VACCINE SPLT 0.5 ML IM: CPT

## 2018-10-05 PROCEDURE — G0008: CPT

## 2018-10-05 RX ORDER — ESCITALOPRAM OXALATE 5 MG/1
5 TABLET ORAL DAILY
Qty: 30 | Refills: 0 | Status: DISCONTINUED | COMMUNITY
Start: 2018-08-31 | End: 2018-10-05

## 2018-10-05 NOTE — PHYSICAL EXAM
[No Acute Distress] : no acute distress [Well Nourished] : well nourished [Well Developed] : well developed [Well-Appearing] : well-appearing [Normal Sclera/Conjunctiva] : normal sclera/conjunctiva [PERRL] : pupils equal round and reactive to light [EOMI] : extraocular movements intact [Normal Outer Ear/Nose] : the outer ears and nose were normal in appearance [Normal Oropharynx] : the oropharynx was normal [No JVD] : no jugular venous distention [Supple] : supple [No Lymphadenopathy] : no lymphadenopathy [No Respiratory Distress] : no respiratory distress  [Clear to Auscultation] : lungs were clear to auscultation bilaterally [No Accessory Muscle Use] : no accessory muscle use [Normal Rate] : normal rate  [Regular Rhythm] : with a regular rhythm [Normal S1, S2] : normal S1 and S2 [No Murmur] : no murmur heard [Pedal Pulses Present] : the pedal pulses are present [No Edema] : there was no peripheral edema [No Extremity Clubbing/Cyanosis] : no extremity clubbing/cyanosis [Soft] : abdomen soft [Non Tender] : non-tender [Non-distended] : non-distended [No Masses] : no abdominal mass palpated [No HSM] : no HSM [Normal Bowel Sounds] : normal bowel sounds [Normal Supraclavicular Nodes] : no supraclavicular lymphadenopathy [Normal Posterior Cervical Nodes] : no posterior cervical lymphadenopathy [Normal Anterior Cervical Nodes] : no anterior cervical lymphadenopathy [No CVA Tenderness] : no CVA  tenderness [No Spinal Tenderness] : no spinal tenderness [No Joint Swelling] : no joint swelling [Grossly Normal Strength/Tone] : grossly normal strength/tone [No Rash] : no rash [Normal Gait] : normal gait [Coordination Grossly Intact] : coordination grossly intact [No Focal Deficits] : no focal deficits [Normal Affect] : the affect was normal [Normal Insight/Judgement] : insight and judgment were intact

## 2018-10-06 NOTE — ASSESSMENT
[FreeTextEntry1] : 59yo M with history of HTN, T2DM, newly dx RADHA, presents for f/u\par \par #H/a - likely 2./2 to stress\par - discussed stress reducing strategies \par - identified triggers and recommended counseling if interested\par \par #Depression - stable\par - cont to monitor off meds\par \par #HTN - BP stable on lisinopril 5mg qd\par - will cont to monitor and consider taking off if weight conts to improve\par \par #DM -\par - A1c stable at 5.8\par - will d./c metformin\par - cont diet and exercise\par \par #HCM\par - flu shot given\par f/u in 3 months or PRN\par d/w Dr Foss

## 2018-10-06 NOTE — REVIEW OF SYSTEMS
[Fever] : no fever [Chills] : no chills [Fatigue] : no fatigue [Night Sweats] : no night sweats [Chest Pain] : no chest pain [Palpitations] : no palpitations [Lower Ext Edema] : no lower extremity edema [Shortness Of Breath] : no shortness of breath [Wheezing] : no wheezing [Nausea] : no nausea [Cough] : no cough [Vomiting] : no vomiting [Abdominal Pain] : no abdominal pain [Joint Pain] : no joint pain [Joint Stiffness] : no joint stiffness [Back Pain] : no back pain [Suicidal] : not suicidal [Insomnia] : no insomnia [Anxiety] : no anxiety [Depression] : no depression

## 2018-10-06 NOTE — HISTORY OF PRESENT ILLNESS
[de-identified] : 59yo M with history of HTN, T2DM, newly dx RADHA, presents for f/u\par \par #H/a - reports ongoing h/a, worse near back of head, feels worse when he gets agitated or stressing with family concerns. \par \par #Depression - was on escitalopram but developed insomnia x4 days, had ED visit with severe HA and CTHead neg. PHQ9 today at 1, down from 15. No SI/HI. Sleeping well with nasal cpap. Depression improved with resolution of family/home concerns. \par \par #HTN - BP stable on lisinopril 5mg qd.\par \par #DMT2 - Cont with metformin, has modified diet and exercises almost every day. Has lost 8 lbs since last visit.

## 2018-10-11 DIAGNOSIS — F32.9 MAJOR DEPRESSIVE DISORDER, SINGLE EPISODE, UNSPECIFIED: ICD-10-CM

## 2018-10-11 DIAGNOSIS — E11.9 TYPE 2 DIABETES MELLITUS WITHOUT COMPLICATIONS: ICD-10-CM

## 2018-10-11 DIAGNOSIS — Z23 ENCOUNTER FOR IMMUNIZATION: ICD-10-CM

## 2018-10-11 DIAGNOSIS — R51 HEADACHE: ICD-10-CM

## 2018-10-15 ENCOUNTER — APPOINTMENT (OUTPATIENT)
Dept: PULMONOLOGY | Facility: CLINIC | Age: 58
End: 2018-10-15

## 2018-12-14 ENCOUNTER — APPOINTMENT (OUTPATIENT)
Dept: PULMONOLOGY | Facility: CLINIC | Age: 58
End: 2018-12-14

## 2019-01-17 ENCOUNTER — APPOINTMENT (OUTPATIENT)
Dept: INTERNAL MEDICINE | Facility: CLINIC | Age: 59
End: 2019-01-17
Payer: MEDICAID

## 2019-01-17 ENCOUNTER — OUTPATIENT (OUTPATIENT)
Dept: OUTPATIENT SERVICES | Facility: HOSPITAL | Age: 59
LOS: 1 days | End: 2019-01-17
Payer: MEDICAID

## 2019-01-17 VITALS
SYSTOLIC BLOOD PRESSURE: 116 MMHG | BODY MASS INDEX: 26.68 KG/M2 | HEIGHT: 66 IN | OXYGEN SATURATION: 95 % | HEART RATE: 110 BPM | WEIGHT: 166 LBS | DIASTOLIC BLOOD PRESSURE: 68 MMHG

## 2019-01-17 DIAGNOSIS — R51 HEADACHE: ICD-10-CM

## 2019-01-17 DIAGNOSIS — F32.9 MAJOR DEPRESSIVE DISORDER, SINGLE EPISODE, UNSPECIFIED: ICD-10-CM

## 2019-01-17 DIAGNOSIS — G47.33 OBSTRUCTIVE SLEEP APNEA (ADULT) (PEDIATRIC): ICD-10-CM

## 2019-01-17 DIAGNOSIS — E11.9 TYPE 2 DIABETES MELLITUS WITHOUT COMPLICATIONS: ICD-10-CM

## 2019-01-17 DIAGNOSIS — I10 ESSENTIAL (PRIMARY) HYPERTENSION: ICD-10-CM

## 2019-01-17 LAB — HBA1C MFR BLD HPLC: 5.8

## 2019-01-17 PROCEDURE — 99213 OFFICE O/P EST LOW 20 MIN: CPT | Mod: GE

## 2019-01-17 PROCEDURE — G0463: CPT

## 2019-01-18 ENCOUNTER — RX RENEWAL (OUTPATIENT)
Age: 59
End: 2019-01-18

## 2019-01-20 PROBLEM — F32.9 DEPRESSION: Status: ACTIVE | Noted: 2018-08-31

## 2019-01-20 PROBLEM — R51 FREQUENT HEADACHES: Status: ACTIVE | Noted: 2018-08-14

## 2019-01-20 NOTE — ASSESSMENT
[FreeTextEntry1] : 59yo M with history of HTN, T2DM, RADHA, presents for f/u\par \par H/a - likely tension h/a vs migraine vs 2/2 cpap vs allergies. Less concern for depression or stress-related\par - f/u pulm to eval cpap setting\par - start rescue treatment with ibuprofen 400mg, acetaminophen 600mg, and caffeine\par - cont flonase for allergies \par \par RADHA on cpap -\par - f/u pulm to eval settings\par \par Depression - resolved \par - phq 2- score 0\par \par HTN - remains normotensive on Lisinopril \par - cont lisinopril\par \par DMT2 - A1c at 5.8 today \par - cont off metformin\par - weight is stable, cont with exercise and diet\par - cont atorvastatin 40mg qd \par - consider d/c ASA based on ASCVD with next lipid panel\par - lipids at next visit\par - cont lisinopril \par \par HCM \par - imm UTD\par - colonoscopy 2014 with diverticulosis\par \par f/u in 5 wks for h/a progress\par - d/w Dr Sarkar

## 2019-01-20 NOTE — REVIEW OF SYSTEMS
[Headache] : headache [Fever] : no fever [Chills] : no chills [Fatigue] : no fatigue [Discharge] : no discharge [Pain] : no pain [Itching] : no itching [Earache] : no earache [Hearing Loss] : no hearing loss [Nasal Discharge] : no nasal discharge [Chest Pain] : no chest pain [Palpitations] : no palpitations [Lower Ext Edema] : no lower extremity edema [Shortness Of Breath] : no shortness of breath [Wheezing] : no wheezing [Cough] : no cough [Abdominal Pain] : no abdominal pain [Nausea] : no nausea [Vomiting] : no vomiting [Joint Pain] : no joint pain [Joint Stiffness] : no joint stiffness [Back Pain] : no back pain [Mole Changes] : no mole changes [Skin Rash] : no skin rash [Dizziness] : no dizziness [Unsteady Walk] : no ataxia

## 2019-01-20 NOTE — HISTORY OF PRESENT ILLNESS
[FreeTextEntry1] : headache [de-identified] : 59yo M with history of HTN, T2DM, newly dx RADHA, presents for f/u\par \par H/a - occurs throughout the day, localized to bilateral frontal,temporal region. Can last up to 4 hours and multiple times a day. Occurs apprx 5-6x/wk. No associated n/v, rhinorrhea, lacrimation improves with laying down. no improvement w excedrin, tylenol, asa. Does not improve with dark room. \par \par RADHA on cpap - using cpap  nightly. Does not feel refreshed in when he wakes up. Does not feel the need to nap during the day. \par \par Depression - Reports doing well since addressing family concerns. \par \par HTN - BP stable on lisinopril 5mg qd.\par \par DMT2 -Remains off metformin.  Continues with diet and exercise almost every day. No weight loss since last viist.

## 2019-01-20 NOTE — PHYSICAL EXAM
[No Acute Distress] : no acute distress [Well Nourished] : well nourished [Well Developed] : well developed [Well-Appearing] : well-appearing [Normal Sclera/Conjunctiva] : normal sclera/conjunctiva [PERRL] : pupils equal round and reactive to light [EOMI] : extraocular movements intact [Normal Outer Ear/Nose] : the outer ears and nose were normal in appearance [Normal Oropharynx] : the oropharynx was normal [No JVD] : no jugular venous distention [Supple] : supple [No Lymphadenopathy] : no lymphadenopathy [No Respiratory Distress] : no respiratory distress  [Clear to Auscultation] : lungs were clear to auscultation bilaterally [No Accessory Muscle Use] : no accessory muscle use [Normal Rate] : normal rate  [Regular Rhythm] : with a regular rhythm [Normal S1, S2] : normal S1 and S2 [No Murmur] : no murmur heard [Pedal Pulses Present] : the pedal pulses are present [No Edema] : there was no peripheral edema [No Extremity Clubbing/Cyanosis] : no extremity clubbing/cyanosis [Soft] : abdomen soft [Non Tender] : non-tender [Non-distended] : non-distended [No Masses] : no abdominal mass palpated [No HSM] : no HSM [Normal Bowel Sounds] : normal bowel sounds [Normal Supraclavicular Nodes] : no supraclavicular lymphadenopathy [Normal Posterior Cervical Nodes] : no posterior cervical lymphadenopathy [Normal Anterior Cervical Nodes] : no anterior cervical lymphadenopathy [No CVA Tenderness] : no CVA  tenderness [No Spinal Tenderness] : no spinal tenderness [No Joint Swelling] : no joint swelling [Grossly Normal Strength/Tone] : grossly normal strength/tone [No Rash] : no rash [Normal Gait] : normal gait [Coordination Grossly Intact] : coordination grossly intact [No Focal Deficits] : no focal deficits [Normal Affect] : the affect was normal [Normal Insight/Judgement] : insight and judgment were intact [de-identified] : no sinus tenderness along forehead or maxillary region

## 2019-02-01 ENCOUNTER — APPOINTMENT (OUTPATIENT)
Dept: PULMONOLOGY | Facility: CLINIC | Age: 59
End: 2019-02-01
Payer: MEDICAID

## 2019-02-01 VITALS
RESPIRATION RATE: 16 BRPM | HEART RATE: 73 BPM | TEMPERATURE: 98.1 F | DIASTOLIC BLOOD PRESSURE: 75 MMHG | WEIGHT: 170 LBS | SYSTOLIC BLOOD PRESSURE: 127 MMHG | HEIGHT: 66 IN | BODY MASS INDEX: 27.32 KG/M2

## 2019-02-01 DIAGNOSIS — Z87.898 PERSONAL HISTORY OF OTHER SPECIFIED CONDITIONS: ICD-10-CM

## 2019-02-01 DIAGNOSIS — G47.33 OBSTRUCTIVE SLEEP APNEA (ADULT) (PEDIATRIC): ICD-10-CM

## 2019-02-01 PROCEDURE — 99214 OFFICE O/P EST MOD 30 MIN: CPT

## 2019-02-01 NOTE — ASSESSMENT
[FreeTextEntry1] : 58 year old REILLY AMOS with comorbid HTN, HLD, DM; presents for continued management of severe obstructive sleep apnea on APAP.\par \par A review of therapeutic and compliance data reveals adequate compliance with usage on 100% of nights averaging 5 hours 1 minute; effective APAP pressures at 4-20 cmH2O reducing AHI to 7.8 (from pre-therapeutic AHI of 40.2), and no significant mask leak.  \par \par The patient is clinically benefitting from APAP therapy, with improvement in sleep, less awakenings, resolution to nightmares.     \par \par Severe RADHA with residual sleepiness\par - increase nightly APAP therapy use to the entire duration of sleep\par - increase in sleep time may alleviate headaches \par - avoid drowsy driving\par \par High pressures sensed at sleep onset \par - APAP Rx altered via [facility] & [facility] notified \par - increased ramp time to 45 min (from 30)\par - reduced ramp start pressure to 5 cmH2O (from 7)\par - narrowed pressure range to 10-18 cmH2O (from 4-20) \par \par Nasal pillows mask dislodgement\par - referred patient for mask and headgear fitting at our sleep center \par \par Supplies\par - renewal order to [facility] entered\par \par Follow-up visit\par - in one year or sooner if needed.

## 2019-02-01 NOTE — PHYSICAL EXAM
[Normal Appearance] : normal appearance [Well Groomed] : well groomed [General Appearance - In No Acute Distress] : no acute distress [Normal Conjunctiva] : the conjunctiva exhibited no abnormalities [IV] : IV [Neck Appearance] : the appearance of the neck was normal [Heart Rate And Rhythm] : heart rate was normal and rhythm regular [Murmurs] : no murmurs [] : no respiratory distress [Auscultation Breath Sounds / Voice Sounds] : lungs were clear to auscultation bilaterally [Involuntary Movements] : no involuntary movements were seen [No Focal Deficits] : no focal deficits [Affect] : the affect was normal [Mood] : the mood was normal [FreeTextEntry2] : no edema

## 2019-02-01 NOTE — REVIEW OF SYSTEMS
[Nasal Congestion] : nasal congestion [Diabetes] : diabetes  [Arthralgias] : arthralgias [Daytime Somnolence: ESS=____] : daytime somnolence: ESS=[unfilled] [Unintentional Sleep while inactive] : unintentional sleep while inactive [Awakes With Headache] : awakes with a headache [Negative] : Genitourinary [Depression] : no depression [Anxious] : not anxious [Snoring] : no snoring [Witnessed Apneas] : demonstrated no ~M apnea [Frequent Nocturnal Awakenings] : no nocturnal awakenings from sleep [Unintentional Sleep while active] : no unintentional sleep while active [Awakes Unrefreshed] : restorative sleep [Awakes With Dry Mouth] : awakes without dry mouth [Recent Wt Gain (___ Lbs)] : no recent weight gain [Difficulty Initiating Sleep] : no difficulty falling asleep [Difficulty Maintaining Sleep] : no difficulty maintaining sleep [Lower Extremity Discomfort] : no lower extremity discomfort [Unusual Sleep Behavior] : no unusual sleep behavior [FreeTextEntry8] : occasional [de-identified] : headaches (see HPI) [FreeTextEntry1] : 12:30 PM - 1 AM [FreeTextEntry2] : 7:30 - 9 AM (9-10 AM prior to starting APAP) [FreeTextEntry3] : quick [FreeTextEntry4] : 1-2 (less than 4-5 X  prior to APAP) [FreeTextEntry5] : brief [FreeTextEntry6] : 6-8 hours  [FreeTextEntry7] : 5 min nap daily at 2-3 PM, refreshing

## 2019-02-01 NOTE — HISTORY OF PRESENT ILLNESS
[ESS: ___] : ESS score [unfilled] [AHI: ___ per hour] : Apnea-hypopnea index:  [unfilled] per hour [T90%: ___] : T90%: [unfilled]% [Steve desatuation%: ___] : Steve desaturation:  [unfilled]% [Date: ___] : the most recent therapeutic polysomnogram was completed [unfilled] [CPAP: ___ cmH2O] : CPAP: [unfilled] cmH2O [% Days used: ____] : Days used: [unfilled] % [% Days used > 4 hrs: ____] : Days used > 4 hrs: [unfilled] % [Mean nightly usage: ___ hrs] : Mean nightly usage: [unfilled] hrs [Therapy based AHI: ___ /hr] : Therapy based AHI: [unfilled] / hr [FreeTextEntry1] : This 58 year year old male with severe obstructive sleep apnea treated with APAP therapy presents for follow-up to 4/2018 visit.  Interview was conducted in [place].\par \par COMORBID: HTN, HLD, and diet-controlled DM \par \par The patient has been treated with APAP therapy since Aug/2018, after he was diagnosed with RADHA in May 2018.  He reports nightly APAP use for 6 to 7 hours, with improvement in sleep, less awakenings, and no nightmares.  He no longer snores, nor experiences nocturnal choking or gasping.   \par \par The patient reports persistent sleepiness and high pressures at sleep onset.  He experiences drowsy driving, but has not fallen asleep while driving.  He denies unintentional sleep.  \par \par He reports sleeping 6-8 hours between 12:30 PM-9 AM, and napping for 5 minutes daily in the mid-afternoon (2-3 PM).  Naps are refreshing.  He falls asleep quickly, and awakens once or twice briefly.  He tolerates his nasal pillows mask well.  The headgear obligates him to sleep supine to prevent the mask from dislocating.  As a result, he removes the mask at 7-8 AM and continues to sleep without it.\par \par He continues to experience diurnal headaches almost daily for > 6 months, without nausea, vomiting, dizziness, or fever.  Headache onset upon awakening or later on in the day - but not nocturnal.  He takes ibuprofen and or tylenol infrequently for fear of ulcers.  He also describes mild intermittent pressure throughout his head with same onset as headaches.  These head symptoms are reportedly unchanged with treatment for sinusitis, depression, and pain. \par \par He works 6 hours 5 days weekly as  from 5 PM - 11 PM for years.  He consumes 1-2 beers daily, 2 coffees in the morning, and smoked only in his teens. [Nocturnal Oxygen] : The patient does not use nocturnal oxygen

## 2019-02-04 ENCOUNTER — RX RENEWAL (OUTPATIENT)
Age: 59
End: 2019-02-04

## 2019-10-02 ENCOUNTER — MEDICATION RENEWAL (OUTPATIENT)
Age: 59
End: 2019-10-02

## 2020-12-18 ENCOUNTER — OUTPATIENT (OUTPATIENT)
Dept: OUTPATIENT SERVICES | Facility: HOSPITAL | Age: 60
LOS: 1 days | End: 2020-12-18
Payer: MEDICAID

## 2020-12-18 ENCOUNTER — LABORATORY RESULT (OUTPATIENT)
Age: 60
End: 2020-12-18

## 2020-12-18 ENCOUNTER — APPOINTMENT (OUTPATIENT)
Dept: INTERNAL MEDICINE | Facility: CLINIC | Age: 60
End: 2020-12-18
Payer: MEDICAID

## 2020-12-18 ENCOUNTER — RESULT CHARGE (OUTPATIENT)
Age: 60
End: 2020-12-18

## 2020-12-18 VITALS
DIASTOLIC BLOOD PRESSURE: 78 MMHG | BODY MASS INDEX: 31.47 KG/M2 | WEIGHT: 195 LBS | SYSTOLIC BLOOD PRESSURE: 130 MMHG | HEART RATE: 94 BPM | OXYGEN SATURATION: 94 %

## 2020-12-18 DIAGNOSIS — I10 ESSENTIAL (PRIMARY) HYPERTENSION: ICD-10-CM

## 2020-12-18 DIAGNOSIS — R53.83 OTHER FATIGUE: ICD-10-CM

## 2020-12-18 PROCEDURE — G0463: CPT | Mod: 25

## 2020-12-18 PROCEDURE — 83036 HEMOGLOBIN GLYCOSYLATED A1C: CPT

## 2020-12-18 PROCEDURE — 90471 IMMUNIZATION ADMIN: CPT

## 2020-12-18 PROCEDURE — 90750 HZV VACC RECOMBINANT IM: CPT

## 2020-12-18 PROCEDURE — 99396 PREV VISIT EST AGE 40-64: CPT | Mod: GC

## 2020-12-18 NOTE — ASSESSMENT
[FreeTextEntry1] : 59 yo M w/ HTN, HLD, DM, severe obstructive sleep apnea on APAP, migraines presents for CPE.

## 2020-12-18 NOTE — HISTORY OF PRESENT ILLNESS
[FreeTextEntry1] : CPE and fatigue [de-identified] : 59 yo M w/ HTN, HLD, DM, severe obstructive sleep apnea on APAP, migraines presents for CPE.\par \par Reports feeling more fatigued in the past 1month, started taking his wife’s metformin 500mg qd, reporting feel better after that. \par \par RADHA: compliant with APAP, feels well.  \par \par Has gained 25 lbs in the past year, reports has been eating a lot of food during the quarantine including pasta. He also has not been exercising as well. No soda.  \par \par No other complaints. ROS negative\par

## 2020-12-18 NOTE — PHYSICAL EXAM
[No Acute Distress] : no acute distress [Well Nourished] : well nourished [Well Developed] : well developed [Well-Appearing] : well-appearing [Normal Sclera/Conjunctiva] : normal sclera/conjunctiva [PERRL] : pupils equal round and reactive to light [No JVD] : no jugular venous distention [No Lymphadenopathy] : no lymphadenopathy [Supple] : supple [Thyroid Normal, No Nodules] : the thyroid was normal and there were no nodules present [No Respiratory Distress] : no respiratory distress  [No Accessory Muscle Use] : no accessory muscle use [Clear to Auscultation] : lungs were clear to auscultation bilaterally [Regular Rhythm] : with a regular rhythm [Normal S1, S2] : normal S1 and S2 [No Murmur] : no murmur heard [Pedal Pulses Present] : the pedal pulses are present [No Edema] : there was no peripheral edema [No Extremity Clubbing/Cyanosis] : no extremity clubbing/cyanosis [Soft] : abdomen soft [Non Tender] : non-tender [Non-distended] : non-distended [No Masses] : no abdominal mass palpated [No HSM] : no HSM [Normal Bowel Sounds] : normal bowel sounds [No Joint Swelling] : no joint swelling [Grossly Normal Strength/Tone] : grossly normal strength/tone [No Rash] : no rash [Coordination Grossly Intact] : coordination grossly intact [No Focal Deficits] : no focal deficits [Normal Gait] : normal gait [Normal Affect] : the affect was normal [Normal Insight/Judgement] : insight and judgment were intact [de-identified] : No ulclers in either foot, sensation intact, DP pulses 2+ bilaterally

## 2020-12-18 NOTE — PLAN
[FreeTextEntry1] : \par #Obesity Gained 25 lbs in the past year, BMI 30\par -diet: advised to decrease carbs (pasta) \par -exercise: advised to walk until can’t finish a sentence\par -Metformin as below will help with weight loss\par -TSH\par -Diabetes wellness program referral given\par \par #HTN - /78\par -c/w lisinopril 5mg qd\par -Advised weight loss, with diet and exercise\par \par RADHA: well controlled\par -c/w APAP\par \par #DM\par -POC A1C: 6.4\par -Microalb/Cr ratio\par -LDL\par -Podiatry and Ophthal referral given\par -Diabetes wellness program referral given\par -Resume Metformin 500 BID\par \par #Fatigue-ness; resolved\par -TSH\par -DM management as above\par \par #HCM\par -CMP, LDL\par -Received Flu Shot 2 months ago at [facility]\par -Shringrix given today\par \par d/w Dr. Barba\par

## 2020-12-21 ENCOUNTER — NON-APPOINTMENT (OUTPATIENT)
Age: 60
End: 2020-12-21

## 2020-12-21 LAB
CHOLEST SERPL-MCNC: 162 MG/DL
HDLC SERPL-MCNC: 78 MG/DL
LDLC SERPL CALC-MCNC: 67 MG/DL
NONHDLC SERPL-MCNC: 85 MG/DL
TRIGL SERPL-MCNC: 88 MG/DL

## 2020-12-23 DIAGNOSIS — E11.9 TYPE 2 DIABETES MELLITUS WITHOUT COMPLICATIONS: ICD-10-CM

## 2020-12-23 DIAGNOSIS — E78.5 HYPERLIPIDEMIA, UNSPECIFIED: ICD-10-CM

## 2020-12-23 DIAGNOSIS — R53.83 OTHER FATIGUE: ICD-10-CM

## 2020-12-23 DIAGNOSIS — Z23 ENCOUNTER FOR IMMUNIZATION: ICD-10-CM

## 2020-12-23 DIAGNOSIS — Z00.00 ENCOUNTER FOR GENERAL ADULT MEDICAL EXAMINATION WITHOUT ABNORMAL FINDINGS: ICD-10-CM

## 2021-02-16 ENCOUNTER — OUTPATIENT (OUTPATIENT)
Dept: OUTPATIENT SERVICES | Facility: HOSPITAL | Age: 61
LOS: 1 days | End: 2021-02-16
Payer: MEDICAID

## 2021-02-16 ENCOUNTER — APPOINTMENT (OUTPATIENT)
Dept: INTERNAL MEDICINE | Facility: CLINIC | Age: 61
End: 2021-02-16

## 2021-02-16 DIAGNOSIS — I10 ESSENTIAL (PRIMARY) HYPERTENSION: ICD-10-CM

## 2021-02-16 DIAGNOSIS — E78.5 HYPERLIPIDEMIA, UNSPECIFIED: ICD-10-CM

## 2021-02-16 DIAGNOSIS — R53.83 OTHER FATIGUE: ICD-10-CM

## 2021-02-16 DIAGNOSIS — Z00.00 ENCOUNTER FOR GENERAL ADULT MEDICAL EXAMINATION WITHOUT ABNORMAL FINDINGS: ICD-10-CM

## 2021-02-16 DIAGNOSIS — E11.9 TYPE 2 DIABETES MELLITUS WITHOUT COMPLICATIONS: ICD-10-CM

## 2021-02-16 DIAGNOSIS — Z23 ENCOUNTER FOR IMMUNIZATION: ICD-10-CM

## 2021-02-16 PROCEDURE — 90471 IMMUNIZATION ADMIN: CPT

## 2021-02-16 PROCEDURE — 96372 THER/PROPH/DIAG INJ SC/IM: CPT

## 2021-04-02 ENCOUNTER — APPOINTMENT (OUTPATIENT)
Dept: INTERNAL MEDICINE | Facility: CLINIC | Age: 61
End: 2021-04-02

## 2021-07-29 ENCOUNTER — NON-APPOINTMENT (OUTPATIENT)
Age: 61
End: 2021-07-29

## 2021-07-30 ENCOUNTER — NON-APPOINTMENT (OUTPATIENT)
Age: 61
End: 2021-07-30

## 2021-08-11 ENCOUNTER — APPOINTMENT (OUTPATIENT)
Dept: PULMONOLOGY | Facility: CLINIC | Age: 61
End: 2021-08-11
Payer: MEDICAID

## 2021-08-11 VITALS
TEMPERATURE: 97 F | DIASTOLIC BLOOD PRESSURE: 84 MMHG | WEIGHT: 194 LBS | OXYGEN SATURATION: 95 % | RESPIRATION RATE: 15 BRPM | HEIGHT: 66 IN | HEART RATE: 106 BPM | BODY MASS INDEX: 31.18 KG/M2 | SYSTOLIC BLOOD PRESSURE: 144 MMHG

## 2021-08-11 PROCEDURE — 99214 OFFICE O/P EST MOD 30 MIN: CPT

## 2021-08-11 NOTE — HISTORY OF PRESENT ILLNESS
[Obstructive Sleep Apnea] : obstructive sleep apnea [Recent  Weight Gain] : recent weight gain [To Bed: ___] : ~he/she~ goes to bed at [unfilled] [Arises: ___] : arises at [unfilled] [Sleep Onset Latency: ___ minutes] : sleep onset latency of [unfilled] minutes reported [Nocturnal Awakenings: ___] : ~he/she~ typically has [unfilled] nocturnal awakenings [WASO: ___] : Wake time after sleep onset is [unfilled] [TST: ___] : Total sleep time is [unfilled] [Daytime Sleep: ___] : daytime sleep: [unfilled] [FreeTextEntry1] : 61 year old REILLY AMOS with severe obstructive sleep apnea on APAP therapy since 2018, presents for follow-up to 2019 visit.  Visit conducted in [place].\par \par COMORBID:  DM, HTN, HLD, obesity class I.\par \par SEVERE RADHA (GRACIELA 40.2) on DreamStation AutoCPAP 10-18 cmH2O using a nasal pillows mask.\par  \par ·	2018 apnea-related signs + symptoms: heavy snoring, witnessed apnea, nocturnal gasping + choking, hypersomnolence, drowsy driving, FTP3. \par ·	Diagnostic home sleep study demonstrated severe RADHA \par \par THERAPY + COMPLIANCE:\par ·	Treated with APAP since 2018\par ·	Patient reports nightly APAP use and during daytime naps, with improvement in sleep + resolution to apnea-related symptoms\par ·	Compliance data: 100% days, average 8 hours 3 minutes.  \par ·	Therapeutic data:  AHI 1.5  Pressure 10-18 (average 11.5) cmH2O. Average large mask leak: 45 mins.\par                                  \par SLEEPIN-hours between 11PM-8AM with 10-15 minute daytime nap almost daily. \par \par INTERIM CHANGES:  8-lb weight gain over 1.5 years.  \par \par Patient was notified of DreamStation recall.  Occasionally mask shifts.  Occupation:  Cook. [Snoring] : no snoring [Witnessed Apneas] : no witnessed sleep apnea [Frequent Nocturnal Awakening] : no nocturnal awakening [Unintentional Sleep while Active] : no unintentional sleep while active [Unintentional Sleep While Inactive] : no unintentional sleep while inactive [Awakes Unrefreshed] : does not awake unrefreshed [Awakes with Headache] : no headache upon awakening [Awakening With Dry Mouth] : no dry mouth upon awakening [Daytime Somnolence] : no daytime somnolence [DIS] : no DIS [DMS] : no DMS [Unusual Sleep Behavior] : no unusual sleep behavior [Lower Extremity Discomfort] : no lower extremity discomfort in evening or at bedtime [ESS] : 4

## 2021-08-11 NOTE — ASSESSMENT
[FreeTextEntry1] : 61 year old REILLY AMOS with DM, HTN, HLD, obesity class I; continues to derive benefit from APAP therapy for severe obstructive sleep apnea since 2018.\par \par SEVERE RADHA (GRACIELA 40.2) on DreamStation AutoCPAP 10-18 cmH2O using a nasal pillows mask.\par \par ·	Patient is experiencing clinical and subjective benefit from therapy. \par ·	Prior apnea-related symptoms have resolved.   \par ·	AHI improved from 40.2 to 1.5\par ·	[place] Sleepiness Scale score of 4 today. \par \par Therapy and Compliance data was reviewed with patient: \par ·	Compliance 100% nightly, averaging 8 hours 3 minutes\par ·	Therapeutic AHI: 1.5\par ·	Mask leak: excessive (patient will secure mask - declined to try a different mask at this time)\par \par RE: [employer] recall for potential health risk related to ingestion/inhalation of the sound-abatement foam used. \par \par Patient with severe RADHA, excessive daytime sleepiness before treatment including drowsy driving, not experiencing any adverse effect, and benefitting from therapy.  Discussed ramifications of the recall, and risk-benefit analysis of continued versus discontinued therapy with patient, advised to follow device instructions for approved cleaning methods, and avoid ozone-based PAP sanitizers.  Reviewed patient options until recall device is repaired or replaced: (1) Continue therapy using the recall device.  (2) Discontinue using recall device + avoid drowsy driving.  (3) For device <5 years old, insurances will generally not replace (or) self-pay for new device.  \par \par Patient's son contacted patient's insurance company + was notified they will replace his recall device.  \par \par PLAN:\par ·	Strong to replace recall device under insurance to continue therapy.\par ·	Follow-up in one year or sooner if needed.

## 2021-08-11 NOTE — REVIEW OF SYSTEMS
[Recent Wt Gain (___ Lbs)] : recent [unfilled] ~Ulb weight gain [Obesity] : obesity [Diabetes] : diabetes  [Heartburn] : heartburn [Anxious] : anxious [Fatigue] : no fatigue [Nasal Congestion] : no nasal congestion [Postnasal Drip] : no postnasal drip [Shortness Of Breath] : no shortness of breath [Chest Pain] : no chest pain [Palpitations] : no palpitations [Edema] : ~T edema was not present [Thyroid Disease] : no thyroid disease [Anemia] : no anemia [History of Iron Deficiency] : no history of iron deficiency [A.M. Headache] : no headache present upon awakening [Nocturia] : no nocturia [Arthralgias] : no arthralgias [Depression] : no depression

## 2022-08-29 ENCOUNTER — LABORATORY RESULT (OUTPATIENT)
Age: 62
End: 2022-08-29

## 2022-08-29 ENCOUNTER — APPOINTMENT (OUTPATIENT)
Dept: INTERNAL MEDICINE | Facility: CLINIC | Age: 62
End: 2022-08-29

## 2022-08-29 ENCOUNTER — OUTPATIENT (OUTPATIENT)
Dept: OUTPATIENT SERVICES | Facility: HOSPITAL | Age: 62
LOS: 1 days | End: 2022-08-29
Payer: MEDICAID

## 2022-08-29 VITALS
BODY MASS INDEX: 30.05 KG/M2 | HEIGHT: 66 IN | DIASTOLIC BLOOD PRESSURE: 90 MMHG | OXYGEN SATURATION: 98 % | HEART RATE: 97 BPM | WEIGHT: 187 LBS | SYSTOLIC BLOOD PRESSURE: 130 MMHG

## 2022-08-29 DIAGNOSIS — I10 ESSENTIAL (PRIMARY) HYPERTENSION: ICD-10-CM

## 2022-08-29 PROCEDURE — 99396 PREV VISIT EST AGE 40-64: CPT | Mod: GE

## 2022-08-29 PROCEDURE — 85027 COMPLETE CBC AUTOMATED: CPT

## 2022-08-29 PROCEDURE — 83036 HEMOGLOBIN GLYCOSYLATED A1C: CPT

## 2022-08-29 PROCEDURE — 80053 COMPREHEN METABOLIC PANEL: CPT

## 2022-08-29 PROCEDURE — 90656 IIV3 VACC NO PRSV 0.5 ML IM: CPT

## 2022-08-29 PROCEDURE — 80061 LIPID PANEL: CPT

## 2022-08-29 PROCEDURE — G0008: CPT

## 2022-08-29 PROCEDURE — G0463: CPT | Mod: 25

## 2022-08-29 RX ORDER — OMEPRAZOLE 20 MG/1
TABLET, DELAYED RELEASE ORAL
Refills: 0 | Status: DISCONTINUED | COMMUNITY
End: 2022-08-29

## 2022-08-29 NOTE — PHYSICAL EXAM
[No Acute Distress] : no acute distress [Well Nourished] : well nourished [Normal Sclera/Conjunctiva] : normal sclera/conjunctiva [Normal Outer Ear/Nose] : the outer ears and nose were normal in appearance [Normal Oropharynx] : the oropharynx was normal [No Lymphadenopathy] : no lymphadenopathy [No Respiratory Distress] : no respiratory distress  [No Accessory Muscle Use] : no accessory muscle use [Clear to Auscultation] : lungs were clear to auscultation bilaterally [Pedal Pulses Present] : the pedal pulses are present [No Edema] : there was no peripheral edema [No Extremity Clubbing/Cyanosis] : no extremity clubbing/cyanosis [Normal] : no joint swelling and grossly normal strength and tone [No Focal Deficits] : no focal deficits [Normal Affect] : the affect was normal [Normal Insight/Judgement] : insight and judgment were intact

## 2022-08-30 ENCOUNTER — NON-APPOINTMENT (OUTPATIENT)
Age: 62
End: 2022-08-30

## 2022-08-30 LAB
A1C WITH ESTIMATED AVERAGE GLUCOSE RESULT: 6.2 % — HIGH (ref 4–5.6)
ALBUMIN SERPL ELPH-MCNC: 5.1 G/DL — HIGH (ref 3.3–5)
ALP SERPL-CCNC: 111 U/L — SIGNIFICANT CHANGE UP (ref 40–120)
ALT FLD-CCNC: 19 U/L — SIGNIFICANT CHANGE UP (ref 10–45)
ANION GAP SERPL CALC-SCNC: 15 MMOL/L — SIGNIFICANT CHANGE UP (ref 5–17)
AST SERPL-CCNC: 17 U/L — SIGNIFICANT CHANGE UP (ref 10–40)
BILIRUB SERPL-MCNC: 0.4 MG/DL — SIGNIFICANT CHANGE UP (ref 0.2–1.2)
BUN SERPL-MCNC: 19 MG/DL — SIGNIFICANT CHANGE UP (ref 7–23)
CALCIUM SERPL-MCNC: 10 MG/DL — SIGNIFICANT CHANGE UP (ref 8.4–10.5)
CHLORIDE SERPL-SCNC: 102 MMOL/L — SIGNIFICANT CHANGE UP (ref 96–108)
CHOLEST SERPL-MCNC: 155 MG/DL — SIGNIFICANT CHANGE UP
CO2 SERPL-SCNC: 23 MMOL/L — SIGNIFICANT CHANGE UP (ref 22–31)
CREAT SERPL-MCNC: 1.1 MG/DL — SIGNIFICANT CHANGE UP (ref 0.5–1.3)
EGFR: 75 ML/MIN/1.73M2 — SIGNIFICANT CHANGE UP
ESTIMATED AVERAGE GLUCOSE: 131 MG/DL — HIGH (ref 68–114)
GLUCOSE SERPL-MCNC: 103 MG/DL — HIGH (ref 70–99)
HCT VFR BLD CALC: 41.3 % — SIGNIFICANT CHANGE UP (ref 39–50)
HDLC SERPL-MCNC: 65 MG/DL — SIGNIFICANT CHANGE UP
HGB BLD-MCNC: 14 G/DL — SIGNIFICANT CHANGE UP (ref 13–17)
LIPID PNL WITH DIRECT LDL SERPL: 65 MG/DL — SIGNIFICANT CHANGE UP
MCHC RBC-ENTMCNC: 31.9 PG — SIGNIFICANT CHANGE UP (ref 27–34)
MCHC RBC-ENTMCNC: 33.9 GM/DL — SIGNIFICANT CHANGE UP (ref 32–36)
MCV RBC AUTO: 94.1 FL — SIGNIFICANT CHANGE UP (ref 80–100)
NON HDL CHOLESTEROL: 90 MG/DL — SIGNIFICANT CHANGE UP
PLATELET # BLD AUTO: 187 K/UL — SIGNIFICANT CHANGE UP (ref 150–400)
POTASSIUM SERPL-MCNC: 5 MMOL/L — SIGNIFICANT CHANGE UP (ref 3.5–5.3)
POTASSIUM SERPL-SCNC: 5 MMOL/L — SIGNIFICANT CHANGE UP (ref 3.5–5.3)
PROT SERPL-MCNC: 8.1 G/DL — SIGNIFICANT CHANGE UP (ref 6–8.3)
RBC # BLD: 4.39 M/UL — SIGNIFICANT CHANGE UP (ref 4.2–5.8)
RBC # FLD: 13.7 % — SIGNIFICANT CHANGE UP (ref 10.3–14.5)
SODIUM SERPL-SCNC: 141 MMOL/L — SIGNIFICANT CHANGE UP (ref 135–145)
TRIGL SERPL-MCNC: 128 MG/DL — SIGNIFICANT CHANGE UP
WBC # BLD: 8.47 K/UL — SIGNIFICANT CHANGE UP (ref 3.8–10.5)
WBC # FLD AUTO: 8.47 K/UL — SIGNIFICANT CHANGE UP (ref 3.8–10.5)

## 2022-08-30 NOTE — PLAN
[FreeTextEntry1] : RADHA: continue CPAP as prescribed. \par - recommended to follow-up with sleep specialist\par \par HTN: continue lisinopril 5mg qd - BP today 130/90\par \par HLD: continue atorvastatin 40mg qd\par - f/u lipid profile\par \par DM: Last A1c 6.4, in pre-DM range. \par - f/u A1c\par - f/u urine microalbumin/Cr\par - ophthalmology referral given\par - foot exam done today - unremarkable\par \par HCM\par - last colonoscopy 2014 --> next due in 2024\par - given flu vaccine today\par \par RTC 6 months for DM f/u. \par \par Case discussed with Dr. Foss.

## 2022-08-30 NOTE — REVIEW OF SYSTEMS
[Fatigue] : fatigue [Negative] : Musculoskeletal [Fever] : no fever [Chills] : no chills [Night Sweats] : no night sweats [Recent Change In Weight] : ~T no recent weight change [Abdominal Pain] : no abdominal pain [Nausea] : no nausea [Constipation] : no constipation [Diarrhea] : no diarrhea [Vomiting] : no vomiting [Heartburn] : no heartburn [Melena] : no melena

## 2022-08-30 NOTE — HISTORY OF PRESENT ILLNESS
[FreeTextEntry1] :  61 yo M w/ HTN, HLD, DM, severe obstructive sleep apnea on CPAP, migraines presents for CPE [de-identified] : Patient has no acute complaints today. \par \par DM - takes metformin 500mg qd. Measures blood sugar occasionally and reports that it is good, cannot recall exact numbers. \par \par HTN - takes lisinopril 5mg qd, does not measure BP at home. \par \par RADHA - sleeps 7-9 hours nightly, uses CPAP which fits well, uses every night. Denies daytime sleepiness while driving or working. \par \par Patient had covid 3 months ago and endorses ongoing fatigue since then. Patient recovered at home without interventions. Denies cough, SOB, CP. Patient did not have symptoms from covid. Endorses occasional headaches rate 1-2/10, with complete relief after taking tylenol. \par \par Patient works as a , lives with wife and has been  for 46 years, has 2 adult children. Drinks alcohol, 1-2 beers several times weekly. Denies current or former smoking. Walks for exercise, 30 minutes 3-4x per week. Denies limitations to exercise tolerance. Diet consists of eggs, fish, chicken, soup, small portions of rice and vegetables. \par \par HCM\par - last colonoscopy was in 2014 - showed severe diverticulosis, recommended to f/u with GI\par \par Patient has never had surgery or been hospitalized. Has no food or allergy medications. Reports good adherence to medication.

## 2022-09-07 DIAGNOSIS — Z00.00 ENCOUNTER FOR GENERAL ADULT MEDICAL EXAMINATION WITHOUT ABNORMAL FINDINGS: ICD-10-CM

## 2022-09-07 DIAGNOSIS — Z23 ENCOUNTER FOR IMMUNIZATION: ICD-10-CM

## 2022-11-06 ENCOUNTER — INPATIENT (INPATIENT)
Facility: HOSPITAL | Age: 62
LOS: 2 days | Discharge: HOME | DRG: 638 | End: 2022-11-09
Attending: HOSPITALIST | Admitting: HOSPITALIST
Payer: MEDICAID

## 2022-11-06 VITALS
DIASTOLIC BLOOD PRESSURE: 82 MMHG | WEIGHT: 179.9 LBS | RESPIRATION RATE: 20 BRPM | SYSTOLIC BLOOD PRESSURE: 150 MMHG | TEMPERATURE: 99 F | HEIGHT: 66 IN | OXYGEN SATURATION: 97 % | HEART RATE: 116 BPM

## 2022-11-06 LAB
ALBUMIN SERPL ELPH-MCNC: 4.7 G/DL — SIGNIFICANT CHANGE UP (ref 3.3–5)
ALP SERPL-CCNC: 103 U/L — SIGNIFICANT CHANGE UP (ref 40–120)
ALT FLD-CCNC: 17 U/L — SIGNIFICANT CHANGE UP (ref 10–45)
ANION GAP SERPL CALC-SCNC: 11 MMOL/L — SIGNIFICANT CHANGE UP (ref 5–17)
AST SERPL-CCNC: 17 U/L — SIGNIFICANT CHANGE UP (ref 10–40)
BASOPHILS # BLD AUTO: 0.02 K/UL — SIGNIFICANT CHANGE UP (ref 0–0.2)
BASOPHILS NFR BLD AUTO: 0.3 % — SIGNIFICANT CHANGE UP (ref 0–2)
BILIRUB SERPL-MCNC: 0.4 MG/DL — SIGNIFICANT CHANGE UP (ref 0.2–1.2)
BUN SERPL-MCNC: 16 MG/DL — SIGNIFICANT CHANGE UP (ref 7–23)
CALCIUM SERPL-MCNC: 9.4 MG/DL — SIGNIFICANT CHANGE UP (ref 8.4–10.5)
CHLORIDE SERPL-SCNC: 100 MMOL/L — SIGNIFICANT CHANGE UP (ref 96–108)
CO2 SERPL-SCNC: 23 MMOL/L — SIGNIFICANT CHANGE UP (ref 22–31)
CREAT SERPL-MCNC: 0.89 MG/DL — SIGNIFICANT CHANGE UP (ref 0.5–1.3)
CRP SERPL-MCNC: 24 MG/L — HIGH (ref 0–4)
EGFR: 96 ML/MIN/1.73M2 — SIGNIFICANT CHANGE UP
EOSINOPHIL # BLD AUTO: 0.08 K/UL — SIGNIFICANT CHANGE UP (ref 0–0.5)
EOSINOPHIL NFR BLD AUTO: 1 % — SIGNIFICANT CHANGE UP (ref 0–6)
GLUCOSE SERPL-MCNC: 156 MG/DL — HIGH (ref 70–99)
HCT VFR BLD CALC: 39.5 % — SIGNIFICANT CHANGE UP (ref 39–50)
HGB BLD-MCNC: 13.4 G/DL — SIGNIFICANT CHANGE UP (ref 13–17)
IMM GRANULOCYTES NFR BLD AUTO: 0.5 % — SIGNIFICANT CHANGE UP (ref 0–0.9)
LYMPHOCYTES # BLD AUTO: 1.4 K/UL — SIGNIFICANT CHANGE UP (ref 1–3.3)
LYMPHOCYTES # BLD AUTO: 17.7 % — SIGNIFICANT CHANGE UP (ref 13–44)
MCHC RBC-ENTMCNC: 30.6 PG — SIGNIFICANT CHANGE UP (ref 27–34)
MCHC RBC-ENTMCNC: 33.9 GM/DL — SIGNIFICANT CHANGE UP (ref 32–36)
MCV RBC AUTO: 90.2 FL — SIGNIFICANT CHANGE UP (ref 80–100)
MONOCYTES # BLD AUTO: 0.66 K/UL — SIGNIFICANT CHANGE UP (ref 0–0.9)
MONOCYTES NFR BLD AUTO: 8.4 % — SIGNIFICANT CHANGE UP (ref 2–14)
NEUTROPHILS # BLD AUTO: 5.7 K/UL — SIGNIFICANT CHANGE UP (ref 1.8–7.4)
NEUTROPHILS NFR BLD AUTO: 72.1 % — SIGNIFICANT CHANGE UP (ref 43–77)
NRBC # BLD: 0 /100 WBCS — SIGNIFICANT CHANGE UP (ref 0–0)
PLATELET # BLD AUTO: 178 K/UL — SIGNIFICANT CHANGE UP (ref 150–400)
POTASSIUM SERPL-MCNC: 4.2 MMOL/L — SIGNIFICANT CHANGE UP (ref 3.5–5.3)
POTASSIUM SERPL-SCNC: 4.2 MMOL/L — SIGNIFICANT CHANGE UP (ref 3.5–5.3)
PROT SERPL-MCNC: 8.2 G/DL — SIGNIFICANT CHANGE UP (ref 6–8.3)
RBC # BLD: 4.38 M/UL — SIGNIFICANT CHANGE UP (ref 4.2–5.8)
RBC # FLD: 13.2 % — SIGNIFICANT CHANGE UP (ref 10.3–14.5)
SODIUM SERPL-SCNC: 134 MMOL/L — LOW (ref 135–145)
WBC # BLD: 7.9 K/UL — SIGNIFICANT CHANGE UP (ref 3.8–10.5)
WBC # FLD AUTO: 7.9 K/UL — SIGNIFICANT CHANGE UP (ref 3.8–10.5)

## 2022-11-06 PROCEDURE — 99285 EMERGENCY DEPT VISIT HI MDM: CPT

## 2022-11-06 PROCEDURE — 73630 X-RAY EXAM OF FOOT: CPT | Mod: 26,LT

## 2022-11-06 NOTE — ED PROVIDER NOTE - RAPID ASSESSMENT
62y M w/ pmhx of DM presents to the ED due to redness, rash to L foot/leg x3days. Reports that he was scratching his leg with his toe nail and cut his skin. Pt is well appearing in triage.     Sukumar CONNELL (John) have documented this rapid assessment note under the dictation of Shyam Osorio (PA) which has been reviewed and affirmed to be accurate. Patient was seen as a QPA patient. The patient will be seen and further worked up in the main emergency department and their care will be completed by the main emergency department team along with a thorough physical exam. Receiving team will follow up on labs, analgesia, any clinical imaging, reassess and disposition as clinically indicated, all decisions regarding the progression of care will be made at their discretion. 62y M w/ pmhx of DM presents to the ED due to redness, rash to L foot/leg x3days. Reports that he was scratching his leg with his toe nail and cut his skin. Pt is well appearing in triage.     Sukumar CONNELL (Jerricaibclifford) have documented this rapid assessment note under the dictation of Shyam Osorio (PA) which has been reviewed and affirmed to be accurate. Patient was seen as a QPA patient. The patient will be seen and further worked up in the main emergency department and their care will be completed by the main emergency department team along with a thorough physical exam. Receiving team will follow up on labs, analgesia, any clinical imaging, reassess and disposition as clinically indicated, all decisions regarding the progression of care will be made at their discretion.    Shyam Osorio PA-C: The scribe's documentation has been prepared under my direction and personally reviewed by me in its entirety. I confirm that the note above accurately reflects history obtained.

## 2022-11-06 NOTE — ED PROVIDER NOTE - PHYSICAL EXAMINATION
General: well appearing, alert, oriented to person, time, place  Psych: mood appropriate  Head: normocephalic; atraumatic  Eyes: conjunctivae clear bilaterally, sclerae anicteric  ENT: no nasal flaring, patent nares  Neuro: normal sensation, moving all four extremities equally  Skin: erythema on dorsum of L foot extending past ankle; weeping wounds with serous discharge; non-warm; non-pitting edema  MSK: normal movement of all extremities  Lymph/Vasc: no LE edema

## 2022-11-06 NOTE — ED PROVIDER NOTE - ATTENDING CONTRIBUTION TO CARE
Patient presents with redness and weeping wound to left foot.  Patient states he injured his foot about 3 weeks ago and the fingernail came off, but it had healed since then.  He said there were no wounds on it until 3 days ago when he suddenly noted redness and weeping wounds on his foot.  He denies any pain around it.  On exam, patient has erythema of the entire dorsum of the foot along with several blistering ulcers, warmth, diffuse swelling of the foot into the proximal calf.  Patient is afebrile with unremarkable vital signs.  Patient has intact motor function and sensory function, with decreased pulses, dopplerable although weak.  There is concern for cellulitis versus possible soft tissue infection given the rapidity of the symptoms and the degree of the erythema/edema.  Patient will get CTA of the foot to assess for soft tissue infection as well as for acute vascular occlusion.  Patient will get podiatry consult for further recommendations and will get broad-spectrum antibiotics.

## 2022-11-06 NOTE — ED PROVIDER NOTE - CLINICAL SUMMARY MEDICAL DECISION MAKING FREE TEXT BOX
62y M with NIDDM here with L foot c/f necrotizing fasciitis vs cellulitis vs osteomyelitis vs PAD. Q Doc ordered XR of foot which did not demonstrate gas in tissue. Will follow up with CT Angio to assess both perfusion and presence of gas. Labs sent by Q Doc sig for elevated ESR, CRP, suggesting infectious process. Will give empiric abx with coverage for MRSA and Pseudomonas. Anticipate admission, podiatry to follow.

## 2022-11-06 NOTE — ED PROVIDER NOTE - OBJECTIVE STATEMENT
REILLY AMOS is a 62y M with a past medical history of NIDDM, HTN, HLD, presenting to the emergency department for redness, swelling, weeping wounds on the L foot and ankle starting three days ago. He injured his toenail on the foot two weeks ago and had a wound which healed slowly. He then noticed the aforementioned symptoms three days ago. He denies fevers, chills, nausea, vomiting. He endorses continued sensation in the toes.

## 2022-11-07 DIAGNOSIS — E78.5 HYPERLIPIDEMIA, UNSPECIFIED: ICD-10-CM

## 2022-11-07 DIAGNOSIS — I10 ESSENTIAL (PRIMARY) HYPERTENSION: ICD-10-CM

## 2022-11-07 DIAGNOSIS — L03.116 CELLULITIS OF LEFT LOWER LIMB: ICD-10-CM

## 2022-11-07 DIAGNOSIS — L03.90 CELLULITIS, UNSPECIFIED: ICD-10-CM

## 2022-11-07 DIAGNOSIS — E11.9 TYPE 2 DIABETES MELLITUS WITHOUT COMPLICATIONS: ICD-10-CM

## 2022-11-07 DIAGNOSIS — Z29.9 ENCOUNTER FOR PROPHYLACTIC MEASURES, UNSPECIFIED: ICD-10-CM

## 2022-11-07 LAB
ERYTHROCYTE [SEDIMENTATION RATE] IN BLOOD: 26 MM/HR — HIGH (ref 0–20)
SARS-COV-2 RNA SPEC QL NAA+PROBE: SIGNIFICANT CHANGE UP

## 2022-11-07 PROCEDURE — 73706 CT ANGIO LWR EXTR W/O&W/DYE: CPT | Mod: 26,LT,MA

## 2022-11-07 PROCEDURE — 99223 1ST HOSP IP/OBS HIGH 75: CPT | Mod: GC

## 2022-11-07 PROCEDURE — 73720 MRI LWR EXTREMITY W/O&W/DYE: CPT | Mod: 26,LT

## 2022-11-07 RX ORDER — VANCOMYCIN HCL 1 G
1000 VIAL (EA) INTRAVENOUS ONCE
Refills: 0 | Status: COMPLETED | OUTPATIENT
Start: 2022-11-07 | End: 2022-11-07

## 2022-11-07 RX ORDER — ACETAMINOPHEN 500 MG
650 TABLET ORAL EVERY 6 HOURS
Refills: 0 | Status: DISCONTINUED | OUTPATIENT
Start: 2022-11-07 | End: 2022-11-09

## 2022-11-07 RX ORDER — SIMVASTATIN 20 MG/1
20 TABLET, FILM COATED ORAL AT BEDTIME
Refills: 0 | Status: DISCONTINUED | OUTPATIENT
Start: 2022-11-07 | End: 2022-11-09

## 2022-11-07 RX ORDER — INSULIN LISPRO 100/ML
VIAL (ML) SUBCUTANEOUS
Refills: 0 | Status: DISCONTINUED | OUTPATIENT
Start: 2022-11-07 | End: 2022-11-09

## 2022-11-07 RX ORDER — DEXTROSE 50 % IN WATER 50 %
25 SYRINGE (ML) INTRAVENOUS ONCE
Refills: 0 | Status: DISCONTINUED | OUTPATIENT
Start: 2022-11-07 | End: 2022-11-09

## 2022-11-07 RX ORDER — SODIUM CHLORIDE 9 MG/ML
1000 INJECTION, SOLUTION INTRAVENOUS
Refills: 0 | Status: DISCONTINUED | OUTPATIENT
Start: 2022-11-07 | End: 2022-11-09

## 2022-11-07 RX ORDER — INSULIN LISPRO 100/ML
VIAL (ML) SUBCUTANEOUS AT BEDTIME
Refills: 0 | Status: DISCONTINUED | OUTPATIENT
Start: 2022-11-07 | End: 2022-11-09

## 2022-11-07 RX ORDER — PIPERACILLIN AND TAZOBACTAM 4; .5 G/20ML; G/20ML
3.38 INJECTION, POWDER, LYOPHILIZED, FOR SOLUTION INTRAVENOUS ONCE
Refills: 0 | Status: COMPLETED | OUTPATIENT
Start: 2022-11-07 | End: 2022-11-07

## 2022-11-07 RX ORDER — DEXTROSE 50 % IN WATER 50 %
12.5 SYRINGE (ML) INTRAVENOUS ONCE
Refills: 0 | Status: DISCONTINUED | OUTPATIENT
Start: 2022-11-07 | End: 2022-11-09

## 2022-11-07 RX ORDER — MUPIROCIN 20 MG/G
1 OINTMENT TOPICAL ONCE
Refills: 0 | Status: COMPLETED | OUTPATIENT
Start: 2022-11-07 | End: 2022-11-07

## 2022-11-07 RX ORDER — LISINOPRIL 2.5 MG/1
10 TABLET ORAL DAILY
Refills: 0 | Status: DISCONTINUED | OUTPATIENT
Start: 2022-11-07 | End: 2022-11-09

## 2022-11-07 RX ORDER — GLUCAGON INJECTION, SOLUTION 0.5 MG/.1ML
1 INJECTION, SOLUTION SUBCUTANEOUS ONCE
Refills: 0 | Status: DISCONTINUED | OUTPATIENT
Start: 2022-11-07 | End: 2022-11-09

## 2022-11-07 RX ORDER — DEXTROSE 50 % IN WATER 50 %
15 SYRINGE (ML) INTRAVENOUS ONCE
Refills: 0 | Status: DISCONTINUED | OUTPATIENT
Start: 2022-11-07 | End: 2022-11-09

## 2022-11-07 RX ORDER — CEFAZOLIN SODIUM 1 G
2000 VIAL (EA) INJECTION EVERY 8 HOURS
Refills: 0 | Status: DISCONTINUED | OUTPATIENT
Start: 2022-11-07 | End: 2022-11-09

## 2022-11-07 RX ORDER — CEFAZOLIN SODIUM 1 G
2000 VIAL (EA) INJECTION ONCE
Refills: 0 | Status: COMPLETED | OUTPATIENT
Start: 2022-11-07 | End: 2022-11-07

## 2022-11-07 RX ORDER — CEFAZOLIN SODIUM 1 G
VIAL (EA) INJECTION
Refills: 0 | Status: DISCONTINUED | OUTPATIENT
Start: 2022-11-07 | End: 2022-11-09

## 2022-11-07 RX ADMIN — Medication 1000 MILLIGRAM(S): at 06:49

## 2022-11-07 RX ADMIN — Medication 100 MILLIGRAM(S): at 04:35

## 2022-11-07 RX ADMIN — MUPIROCIN 1 APPLICATION(S): 20 OINTMENT TOPICAL at 11:49

## 2022-11-07 RX ADMIN — Medication 250 MILLIGRAM(S): at 05:10

## 2022-11-07 RX ADMIN — Medication 100 MILLIGRAM(S): at 20:47

## 2022-11-07 RX ADMIN — PIPERACILLIN AND TAZOBACTAM 200 GRAM(S): 4; .5 INJECTION, POWDER, LYOPHILIZED, FOR SOLUTION INTRAVENOUS at 04:11

## 2022-11-07 NOTE — ED ADULT NURSE NOTE - ED STAT RN HANDOFF DETAILS 2
hand off to oncoming RN Cris Antonio. Pt awaiting bed. A&Ox4. No c/o. IVL intact without sx of infilt. Voiding well. Ate breakfast.

## 2022-11-07 NOTE — H&P ADULT - PROBLEM SELECTOR PLAN 5
Normal rate, regular rhythm, normal S1, S2 heart sounds heard. DVT ppx: IMPROVE-Score 1, not indicated, patient mobile  Diet: CC/DASH w/ evening snack  Dispo: Home pending improvement in cellulitis    FULL CODE

## 2022-11-07 NOTE — H&P ADULT - NSHPPHYSICALEXAM_GEN_ALL_CORE
VITALS:   T(C): 36.7 (11-07-22 @ 11:20), Max: 37.2 (11-06-22 @ 20:56)  HR: 90 (11-07-22 @ 11:20) (66 - 116)  BP: 110/70 (11-07-22 @ 11:20) (110/70 - 150/82)  RR: 16 (11-07-22 @ 11:20) (16 - 20)  SpO2: 100% (11-07-22 @ 11:20) (97% - 100%)    GENERAL: NAD, lying in bed comfortably  HEAD:  Atraumatic, Normocephalic  EYES: EOMI, PERRLA, conjunctiva and sclera clear  ENT: Moist mucous membranes  NECK: Supple, No JVD  CHEST/LUNG: Clear to auscultation bilaterally; No rales, rhonchi, wheezing, or rubs. Unlabored respirations  HEART: Regular rate and rhythm; No murmurs, rubs, or gallops  ABDOMEN: BSx4; Soft, nontender, nondistended  EXTREMITIES:  2+ Peripheral Pulses, brisk capillary refill. No clubbing, cyanosis, or edema  NERVOUS SYSTEM:  A&Ox3, no focal deficits   SKIN: No rashes or lesions  Psych: Normal speech, normal behavior, normal affect VITALS:   T(C): 36.7 (11-07-22 @ 11:20), Max: 37.2 (11-06-22 @ 20:56)  HR: 90 (11-07-22 @ 11:20) (66 - 116)  BP: 110/70 (11-07-22 @ 11:20) (110/70 - 150/82)  RR: 16 (11-07-22 @ 11:20) (16 - 20)  SpO2: 100% (11-07-22 @ 11:20) (97% - 100%)    GENERAL: NAD, lying in bed comfortably  HEAD:  Atraumatic, Normocephalic  EYES: EOMI, PERRLA, conjunctiva and sclera clear  ENT: Moist mucous membranes  NECK: Supple, No JVD  CHEST/LUNG: Clear to auscultation bilaterally; No rales, rhonchi, wheezing, or rubs. Unlabored respirations  HEART: Regular rate and rhythm; No murmurs, rubs, or gallops  ABDOMEN: BSx4; Soft, nontender, nondistended  EXTREMITIES:  2+ Peripheral Pulses, brisk capillary refill. LLE w/ erythema & 1+ edema to knee; wound w/ dressing c/d/i.  NERVOUS SYSTEM:  A&Ox3, no focal deficits   SKIN: Per extremities exam  Psych: Normal speech, normal behavior, normal affect

## 2022-11-07 NOTE — CONSULT NOTE ADULT - SUBJECTIVE AND OBJECTIVE BOX
Patient is a 62y old  Male who presents with a chief complaint of     HPI: 62y M w/ pmhx of DM presents to the ED due to redness, rash to L foot/leg x3days. Reports that he was scratching his leg with his toe nail and cut his skin. Denies N/V/D, fever, chills.     PAST MEDICAL & SURGICAL HISTORY:  Diabetes      HTN (hypertension)      Hyperlipidemia      Depression      No significant past surgical history          MEDICATIONS  (STANDING):    MEDICATIONS  (PRN):      Allergies    No Known Allergies    Intolerances        VITALS:    Vital Signs Last 24 Hrs  T(C): 36.7 (07 Nov 2022 04:13), Max: 37.2 (06 Nov 2022 20:56)  T(F): 98 (07 Nov 2022 04:13), Max: 99 (06 Nov 2022 20:56)  HR: 66 (07 Nov 2022 04:13) (66 - 116)  BP: 134/68 (07 Nov 2022 04:13) (134/68 - 150/82)  BP(mean): --  RR: 16 (07 Nov 2022 04:13) (16 - 20)  SpO2: 99% (07 Nov 2022 04:13) (97% - 99%)    Parameters below as of 07 Nov 2022 04:13  Patient On (Oxygen Delivery Method): room air        LABS:                          13.4   7.90  )-----------( 178      ( 06 Nov 2022 21:19 )             39.5       11-06    134<L>  |  100  |  16  ----------------------------<  156<H>  4.2   |  23  |  0.89    Ca    9.4      06 Nov 2022 21:19    TPro  8.2  /  Alb  4.7  /  TBili  0.4  /  DBili  x   /  AST  17  /  ALT  17  /  AlkPhos  103  11-06      CAPILLARY BLOOD GLUCOSE              LOWER EXTREMITY PHYSICAL EXAM:    Vascular: DP/PT 2/4, B/L, CFT <3 seconds B/L, Temperature gradient warm to warm left foot, warm to cool right foot.    Neuro: Epicritic sensation decreased to the level of forefoot B/L.  Musculoskeletal/Ortho: No pain on palpation to the left foot, lesser digital contractions   Skin:  Exam: Left foot hallux erythema with edema with hyperkeratotic skin, erythema and edema to the level of distal leg, lesions on the anterior leg to the level of subQ, no IDM, no tracking/tunneling/malodor, no plantar wounds. No clinical signs of infection on the right foot     RADIOLOGY & ADDITIONAL STUDIES:  < from: Xray Foot AP + Lateral + Oblique, Left (11.06.22 @ 21:27) >    ******PRELIMINARY REPORT******      ******PRELIMINARY REPORT******       ACC: [id number] EXAM:  XR FOOT COMP MIN 3 VIEWS LT                          PROCEDURE DATE:  11/06/2022    ******PRELIMINARY REPORT******      ******PRELIMINARY REPORT******           INTERPRETATION:  CLINICAL INDICATION: Left foot redness. Concerns for   osteomyelitis    TECHNIQUE:  X-ray 3 views of the left foot.    COMPARISON: X-ray left foot dated 5/30/2016    IMPRESSION:  No acute fracture nor dislocation.  The joint spaces are preserved with smooth articular margins.  Tarsometatarsal alignment maintained without evidence for a Lisfranc   injury.  There are no lytic or blastic lesions.  No subcutaneous gas collections.  Moderate vascular calcifications.        ******PRELIMINARY REPORT******      ******PRELIMINARY REPORT******        DENYS BEAUCHAMP MD; Resident Radiologist  This document is a PRELIMINARY interpretation and is pending final   attending approval. Nov 6 2022 10:15PM    < end of copied text >

## 2022-11-07 NOTE — PROGRESS NOTE ADULT - SUBJECTIVE AND OBJECTIVE BOX
Patient is a 62y old  Male who presents with a chief complaint of Cellulitis (07 Nov 2022 13:14)       INTERVAL HPI/OVERNIGHT EVENTS:  Patient seen and evaluated at bedside.  Pt is resting comfortable in NAD. Denies N/V/F/C.  Pain rated at X/10    Allergies    No Known Allergies    Intolerances        Vital Signs Last 24 Hrs  T(C): 36.7 (07 Nov 2022 11:20), Max: 37.2 (06 Nov 2022 20:56)  T(F): 98 (07 Nov 2022 11:20), Max: 99 (06 Nov 2022 20:56)  HR: 90 (07 Nov 2022 11:20) (66 - 116)  BP: 110/70 (07 Nov 2022 11:20) (110/70 - 150/82)  BP(mean): --  RR: 16 (07 Nov 2022 11:20) (16 - 20)  SpO2: 100% (07 Nov 2022 11:20) (97% - 100%)    Parameters below as of 07 Nov 2022 11:20  Patient On (Oxygen Delivery Method): room air        LABS:                        13.4   7.90  )-----------( 178      ( 06 Nov 2022 21:19 )             39.5     11-06    134<L>  |  100  |  16  ----------------------------<  156<H>  4.2   |  23  |  0.89    Ca    9.4      06 Nov 2022 21:19    TPro  8.2  /  Alb  4.7  /  TBili  0.4  /  DBili  x   /  AST  17  /  ALT  17  /  AlkPhos  103  11-06        CAPILLARY BLOOD GLUCOSE      POCT Blood Glucose.: 124 mg/dL (07 Nov 2022 15:10)      Lower Extremity Physical Exam:  Vascular: DP/PT 2/4, B/L, CFT <3 seconds B/L, Temperature gradient warm to cool left foot, warm to cool right foot.    Neuro: Epicritic sensation absent to the level of forefoot B/L.  Musculoskeletal/Ortho: No pain on palpation to the left foot, lesser digital contractures  Skin: L dorsal foot redness, no warm to touch +1 pitting edema, dorsal 1st interspace I&D w serous drainage expressed, no malodor, no tracking, no tunneling. No clinical signs of infection on the right foot     RADIOLOGY & ADDITIONAL TESTS:

## 2022-11-07 NOTE — H&P ADULT - ASSESSMENT
62M PMH T2DM, HTN, HLD presented to the ED for left lower extremity redness & swelling, likely i/s/o LLE cellulitis s/p I&D by podiatry. Imaging without signs of osteomyelitis or necrotizing fasciitis. On IV antibiotics w/ local wound care pending MRI.

## 2022-11-07 NOTE — H&P ADULT - ATTENDING COMMENTS
-Patient's LLE pain/swelling/warmth improved after receiving broad spectrum IV abx. -Would c/w abx with cefazolin for now. -F/u MRI. -Would monitor only abx alone for now and hold off on steroids/benadryl for now. Pods appreciated.

## 2022-11-07 NOTE — H&P ADULT - HISTORY OF PRESENT ILLNESS
62M PMH T2DM, HTN, HLD presented to the ED for  62M PMH T2DM, HTN, HLD presented to the ED for left lower extremity redness & swelling. Per the patient, three weeks ago he had an injury to his left big toe. Since that time, he has had worsening redness & swelling in the left lower extremity to the mid-calf. He did not seek care for this. He used topical Neopsorin without benefit. He came to the ED, as the swelling became out of hand. Denies fevers, chills, shortness of breath, cough, chest pain, abdominal pain, N/V/D.    In the ED, he was afebrile, HDS. Labs revealed elevated ESR 26, CRP 24. XR foot w/o evidence of osteo or gas. CTA lower extremity with good blood flow, no signs of osteo, necrotizing fasciitis, or drainable abscess. Received IV clindamycin, Zosyn, vancomycin x1. Underwent I&D by podiatry, culture sent. Admitted to medicine for further management.    History taken in Yoruba, as provider speaks language. Given this, patient refused .

## 2022-11-07 NOTE — CONSULT NOTE ADULT - ASSESSMENT
62 y.o M with left foot cellulitis to level of distal leg  - Pt seen and evaluated  - Afebrile, no leukocytosis, ESR 26, CRP 24  - Exam: Left foot hallux erythema with edema with hyperkeratotic skin, erythema and edema to the level of distal leg, lesions on the anterior leg to the level of subQ, no IDM, no tracking/tunneling/malodor, no plantar wounds. No clinical signs of infection on the right foot   - Left foot xray: no gas, no OM (prelim)  - Using sterile suture removal kit and #15 blade, dorsal incision was made to the dorsal 1st interspace to the level of subQ and not beyond, no drainage was expressed, no tracking or tunneling, no malodor, probe to subQ, no purulence.   - Left foot wound cultured  - Recommend Admit to medicine   - Recommend Vanco/zosyn  - Rx mupirocin  - Ordered Left foot MRI   -Pod plan: Local wound care with IV abx pending erythema resolution

## 2022-11-07 NOTE — ED ADULT NURSE NOTE - OBJECTIVE STATEMENT
62y M w/ pmhx of DM presents to the ED due to redness, rash to L foot/leg x3days. Reports that he was scratching his leg with his toe nail and cut his skin.

## 2022-11-07 NOTE — H&P ADULT - PROBLEM SELECTOR PLAN 1
Patient with increased redness/swelling of LLE after abrasion to the skin 3 weeks ago  Imaging c/w cellulitis w/o evidence of abscess, osteomyelitis, necrotizing fasciitis  s/p I&D by podiatry  s/p clindamycin, vanc/Zosyn in ED  - Continue with IV antibiotics  - Local wound care per podiatry including Bacitracin  - Ambulate as tolerated Patient with increased redness/swelling of LLE after abrasion to the skin 3 weeks ago  Imaging c/w cellulitis w/o evidence of abscess, osteomyelitis, necrotizing fasciitis  s/p I&D by podiatry  s/p clindamycin, vanc/Zosyn in ED  - Continue with IV antibiotics  -->No prior hospitalizations, history of MRSA, purulence, will do cefazolin (11/7-)  - MRSA/MSSA swab  - Podiatry on board, concerned for allergic reaction/dermatitis  -->Will check procal, if not elevated, will consider prednisone/Benadryl  - Local wound care per podiatry including Bacitracin  - Ambulate as tolerated

## 2022-11-08 LAB
A1C WITH ESTIMATED AVERAGE GLUCOSE RESULT: 6.6 % — HIGH (ref 4–5.6)
ANION GAP SERPL CALC-SCNC: 12 MMOL/L — SIGNIFICANT CHANGE UP (ref 5–17)
BASOPHILS # BLD AUTO: 0.02 K/UL — SIGNIFICANT CHANGE UP (ref 0–0.2)
BASOPHILS NFR BLD AUTO: 0.3 % — SIGNIFICANT CHANGE UP (ref 0–2)
BUN SERPL-MCNC: 13 MG/DL — SIGNIFICANT CHANGE UP (ref 7–23)
CALCIUM SERPL-MCNC: 9.5 MG/DL — SIGNIFICANT CHANGE UP (ref 8.4–10.5)
CHLORIDE SERPL-SCNC: 100 MMOL/L — SIGNIFICANT CHANGE UP (ref 96–108)
CO2 SERPL-SCNC: 24 MMOL/L — SIGNIFICANT CHANGE UP (ref 22–31)
CREAT SERPL-MCNC: 0.86 MG/DL — SIGNIFICANT CHANGE UP (ref 0.5–1.3)
EGFR: 97 ML/MIN/1.73M2 — SIGNIFICANT CHANGE UP
EOSINOPHIL # BLD AUTO: 0.22 K/UL — SIGNIFICANT CHANGE UP (ref 0–0.5)
EOSINOPHIL NFR BLD AUTO: 3.5 % — SIGNIFICANT CHANGE UP (ref 0–6)
ESTIMATED AVERAGE GLUCOSE: 143 MG/DL — HIGH (ref 68–114)
GLUCOSE SERPL-MCNC: 117 MG/DL — HIGH (ref 70–99)
HCT VFR BLD CALC: 40.1 % — SIGNIFICANT CHANGE UP (ref 39–50)
IMM GRANULOCYTES NFR BLD AUTO: 0.3 % — SIGNIFICANT CHANGE UP (ref 0–0.9)
LYMPHOCYTES # BLD AUTO: 1.23 K/UL — SIGNIFICANT CHANGE UP (ref 1–3.3)
MAGNESIUM SERPL-MCNC: 2.1 MG/DL — SIGNIFICANT CHANGE UP (ref 1.6–2.6)
MCHC RBC-ENTMCNC: 30.7 PG — SIGNIFICANT CHANGE UP (ref 27–34)
MONOCYTES # BLD AUTO: 0.62 K/UL — SIGNIFICANT CHANGE UP (ref 0–0.9)
NEUTROPHILS NFR BLD AUTO: 66.1 % — SIGNIFICANT CHANGE UP (ref 43–77)
NRBC # BLD: 0 /100 WBCS — SIGNIFICANT CHANGE UP (ref 0–0)
PHOSPHATE SERPL-MCNC: 4.3 MG/DL — SIGNIFICANT CHANGE UP (ref 2.5–4.5)
PLATELET # BLD AUTO: 177 K/UL — SIGNIFICANT CHANGE UP (ref 150–400)
POTASSIUM SERPL-MCNC: 4.4 MMOL/L — SIGNIFICANT CHANGE UP (ref 3.5–5.3)
POTASSIUM SERPL-SCNC: 4.4 MMOL/L — SIGNIFICANT CHANGE UP (ref 3.5–5.3)
PROCALCITONIN SERPL-MCNC: 0.07 NG/ML — SIGNIFICANT CHANGE UP (ref 0.02–0.1)
RBC # FLD: 13.3 % — SIGNIFICANT CHANGE UP (ref 10.3–14.5)
SODIUM SERPL-SCNC: 136 MMOL/L — SIGNIFICANT CHANGE UP (ref 135–145)
WBC # BLD: 6.22 K/UL — SIGNIFICANT CHANGE UP (ref 3.8–10.5)
WBC # FLD AUTO: 6.22 K/UL — SIGNIFICANT CHANGE UP (ref 3.8–10.5)

## 2022-11-08 PROCEDURE — 99233 SBSQ HOSP IP/OBS HIGH 50: CPT | Mod: GC

## 2022-11-08 RX ORDER — HYDROCORTISONE 1 %
1 OINTMENT (GRAM) TOPICAL
Refills: 0 | Status: DISCONTINUED | OUTPATIENT
Start: 2022-11-08 | End: 2022-11-08

## 2022-11-08 RX ORDER — CHLORHEXIDINE GLUCONATE 213 G/1000ML
1 SOLUTION TOPICAL DAILY
Refills: 0 | Status: DISCONTINUED | OUTPATIENT
Start: 2022-11-08 | End: 2022-11-09

## 2022-11-08 RX ORDER — DIPHENHYDRAMINE HCL 50 MG
25 CAPSULE ORAL ONCE
Refills: 0 | Status: COMPLETED | OUTPATIENT
Start: 2022-11-08 | End: 2022-11-08

## 2022-11-08 RX ADMIN — Medication 1 APPLICATION(S): at 13:57

## 2022-11-08 RX ADMIN — LISINOPRIL 10 MILLIGRAM(S): 2.5 TABLET ORAL at 05:19

## 2022-11-08 RX ADMIN — SIMVASTATIN 20 MILLIGRAM(S): 20 TABLET, FILM COATED ORAL at 01:17

## 2022-11-08 RX ADMIN — CHLORHEXIDINE GLUCONATE 1 APPLICATION(S): 213 SOLUTION TOPICAL at 13:14

## 2022-11-08 RX ADMIN — Medication 25 MILLIGRAM(S): at 13:14

## 2022-11-08 RX ADMIN — Medication 100 MILLIGRAM(S): at 16:46

## 2022-11-08 RX ADMIN — SIMVASTATIN 20 MILLIGRAM(S): 20 TABLET, FILM COATED ORAL at 22:48

## 2022-11-08 RX ADMIN — Medication 2: at 16:48

## 2022-11-08 RX ADMIN — Medication 100 MILLIGRAM(S): at 01:18

## 2022-11-08 RX ADMIN — Medication 100 MILLIGRAM(S): at 09:43

## 2022-11-08 NOTE — PROGRESS NOTE ADULT - SUBJECTIVE AND OBJECTIVE BOX
PROGRESS NOTE:     Patient is a 62y old  Male who presents with a chief complaint of Cellulitis (07 Nov 2022 16:08)      SUBJECTIVE / OVERNIGHT EVENTS:   No acute events overnight. Pt denies pain in foot, saying he feels better. Denies fever, chills, n/v, diarrhea, constipation, CP, SOB.    REVIEW OF SYSTEMS:    CONSTITUTIONAL: No weakness, fevers or chills  EYES/ENT: No visual changes;  No vertigo or throat pain   NECK: No pain or stiffness  RESPIRATORY: No cough, wheezing, hemoptysis; No shortness of breath  CARDIOVASCULAR: No chest pain or palpitations  GASTROINTESTINAL: No abdominal or epigastric pain. No nausea, vomiting, or hematemesis; No diarrhea or constipation. No melena or hematochezia.  GENITOURINARY: No dysuria, frequency or hematuria  NEUROLOGICAL: No numbness or weakness  SKIN: No itching, rashes    MEDICATIONS  (STANDING):  ceFAZolin   IVPB      ceFAZolin   IVPB 2000 milliGRAM(s) IV Intermittent every 8 hours  dextrose 5%. 1000 milliLiter(s) (100 mL/Hr) IV Continuous <Continuous>  dextrose 5%. 1000 milliLiter(s) (50 mL/Hr) IV Continuous <Continuous>  dextrose 50% Injectable 25 Gram(s) IV Push once  dextrose 50% Injectable 12.5 Gram(s) IV Push once  dextrose 50% Injectable 25 Gram(s) IV Push once  glucagon  Injectable 1 milliGRAM(s) IntraMuscular once  insulin lispro (ADMELOG) corrective regimen sliding scale   SubCutaneous three times a day before meals  insulin lispro (ADMELOG) corrective regimen sliding scale   SubCutaneous at bedtime  lisinopril 10 milliGRAM(s) Oral daily  simvastatin 20 milliGRAM(s) Oral at bedtime    MEDICATIONS  (PRN):  acetaminophen     Tablet .. 650 milliGRAM(s) Oral every 6 hours PRN Temp greater or equal to 38C (100.4F), Mild Pain (1 - 3)  dextrose Oral Gel 15 Gram(s) Oral once PRN Blood Glucose LESS THAN 70 milliGRAM(s)/deciliter      CAPILLARY BLOOD GLUCOSE      POCT Blood Glucose.: 138 mg/dL (07 Nov 2022 21:49)  POCT Blood Glucose.: 124 mg/dL (07 Nov 2022 15:10)    I&O's Summary      PHYSICAL EXAM:  Vital Signs Last 24 Hrs  T(C): 36.6 (08 Nov 2022 04:44), Max: 36.7 (07 Nov 2022 11:20)  T(F): 97.8 (08 Nov 2022 04:44), Max: 98 (07 Nov 2022 11:20)  HR: 78 (08 Nov 2022 04:44) (78 - 97)  BP: 122/88 (08 Nov 2022 04:44) (110/70 - 124/75)  BP(mean): --  RR: 18 (08 Nov 2022 04:44) (16 - 18)  SpO2: 99% (08 Nov 2022 04:44) (99% - 100%)    Parameters below as of 08 Nov 2022 04:44  Patient On (Oxygen Delivery Method): room air        CONSTITUTIONAL: NAD, well-developed  RESPIRATORY: Normal respiratory effort; lungs are clear to auscultation bilaterally  CARDIOVASCULAR: Regular rate and rhythm, normal S1 and S2, no murmur/rub/gallop; No lower extremity edema; Peripheral pulses are 2+ bilaterally  ABDOMEN: Nontender to palpation, normoactive bowel sounds, no rebound/guarding; No hepatosplenomegaly  MUSCLOSKELETAL: no clubbing or cyanosis of digits; no joint swelling or tenderness to palpation, mild erythema and swelling of LLE  PSYCH: A+O to person, place, and time; affect appropriate  NEURO: Non-focal, no tremors  SKIN: No rashes    LABS:                        13.4   7.90  )-----------( 178      ( 06 Nov 2022 21:19 )             39.5     11-08    136  |  100  |  13  ----------------------------<  117<H>  4.4   |  24  |  0.86    Ca    9.5      08 Nov 2022 06:49  Phos  4.3     11-08  Mg     2.1     11-08    TPro  8.2  /  Alb  4.7  /  TBili  0.4  /  DBili  x   /  AST  17  /  ALT  17  /  AlkPhos  103  11-06              Culture - Abscess with Gram Stain (collected 07 Nov 2022 07:32)  Source: .Abscess left foot  Preliminary Report (08 Nov 2022 07:01):    No growth        RADIOLOGY & ADDITIONAL TESTS:  No new imaging or tests    COORDINATION OF CARE:  Care Discussed with Consultants/Other Providers [Y/N]:  Prior or Outpatient Records Reviewed [Y/N]:   PROGRESS NOTE:     Patient is a 62y old  Male who presents with a chief complaint of Cellulitis (07 Nov 2022 16:08)      SUBJECTIVE / OVERNIGHT EVENTS:   No acute events overnight. Pt denies pain in foot, saying he feels better. Denies fever, chills, n/v, diarrhea, constipation, CP, SOB.  Decreased swelling and erythema to LLE. Sensation intact.     REVIEW OF SYSTEMS:    CONSTITUTIONAL: No weakness, fevers or chills  EYES/ENT: No visual changes;  No vertigo or throat pain   NECK: No pain or stiffness  RESPIRATORY: No cough, wheezing, hemoptysis; No shortness of breath  CARDIOVASCULAR: No chest pain or palpitations  GASTROINTESTINAL: No abdominal or epigastric pain. No nausea, vomiting, or hematemesis; No diarrhea or constipation. No melena or hematochezia.  GENITOURINARY: No dysuria, frequency or hematuria  NEUROLOGICAL: No numbness or weakness  SKIN: No itching, rashes    MEDICATIONS  (STANDING):  ceFAZolin   IVPB      ceFAZolin   IVPB 2000 milliGRAM(s) IV Intermittent every 8 hours  dextrose 5%. 1000 milliLiter(s) (100 mL/Hr) IV Continuous <Continuous>  dextrose 5%. 1000 milliLiter(s) (50 mL/Hr) IV Continuous <Continuous>  dextrose 50% Injectable 25 Gram(s) IV Push once  dextrose 50% Injectable 12.5 Gram(s) IV Push once  dextrose 50% Injectable 25 Gram(s) IV Push once  glucagon  Injectable 1 milliGRAM(s) IntraMuscular once  insulin lispro (ADMELOG) corrective regimen sliding scale   SubCutaneous three times a day before meals  insulin lispro (ADMELOG) corrective regimen sliding scale   SubCutaneous at bedtime  lisinopril 10 milliGRAM(s) Oral daily  simvastatin 20 milliGRAM(s) Oral at bedtime    MEDICATIONS  (PRN):  acetaminophen     Tablet .. 650 milliGRAM(s) Oral every 6 hours PRN Temp greater or equal to 38C (100.4F), Mild Pain (1 - 3)  dextrose Oral Gel 15 Gram(s) Oral once PRN Blood Glucose LESS THAN 70 milliGRAM(s)/deciliter      CAPILLARY BLOOD GLUCOSE      POCT Blood Glucose.: 138 mg/dL (07 Nov 2022 21:49)  POCT Blood Glucose.: 124 mg/dL (07 Nov 2022 15:10)    I&O's Summary      PHYSICAL EXAM:  Vital Signs Last 24 Hrs  T(C): 36.6 (08 Nov 2022 04:44), Max: 36.7 (07 Nov 2022 11:20)  T(F): 97.8 (08 Nov 2022 04:44), Max: 98 (07 Nov 2022 11:20)  HR: 78 (08 Nov 2022 04:44) (78 - 97)  BP: 122/88 (08 Nov 2022 04:44) (110/70 - 124/75)  BP(mean): --  RR: 18 (08 Nov 2022 04:44) (16 - 18)  SpO2: 99% (08 Nov 2022 04:44) (99% - 100%)    Parameters below as of 08 Nov 2022 04:44  Patient On (Oxygen Delivery Method): room air        CONSTITUTIONAL: NAD, well-developed  RESPIRATORY: Normal respiratory effort; lungs are clear to auscultation bilaterally  CARDIOVASCULAR: Regular rate and rhythm, normal S1 and S2, no murmur/rub/gallop; No lower extremity edema; Peripheral pulses are 2+ bilaterally  ABDOMEN: Nontender to palpation, normoactive bowel sounds, no rebound/guarding; No hepatosplenomegaly  MUSCLOSKELETAL: no clubbing or cyanosis of digits; no joint swelling or tenderness to palpation, mild erythema and swelling of LLE  PSYCH: A+O to person, place, and time; affect appropriate  NEURO: Non-focal, no tremors  SKIN: No rashes    LABS:                        13.4   7.90  )-----------( 178      ( 06 Nov 2022 21:19 )             39.5     11-08    136  |  100  |  13  ----------------------------<  117<H>  4.4   |  24  |  0.86    Ca    9.5      08 Nov 2022 06:49  Phos  4.3     11-08  Mg     2.1     11-08    TPro  8.2  /  Alb  4.7  /  TBili  0.4  /  DBili  x   /  AST  17  /  ALT  17  /  AlkPhos  103  11-06              Culture - Abscess with Gram Stain (collected 07 Nov 2022 07:32)  Source: .Abscess left foot  Preliminary Report (08 Nov 2022 07:01):    No growth        RADIOLOGY & ADDITIONAL TESTS:  No new imaging or tests    COORDINATION OF CARE:  Care Discussed with Consultants/Other Providers [Y/N]:  Prior or Outpatient Records Reviewed [Y/N]:

## 2022-11-08 NOTE — PROGRESS NOTE ADULT - SUBJECTIVE AND OBJECTIVE BOX
Patient is a 62y old  Male who presents with a chief complaint of Cellulitis (08 Nov 2022 08:49)       INTERVAL HPI/OVERNIGHT EVENTS:  Patient seen and evaluated at bedside.  Pt is resting comfortable in NAD. Denies N/V/F/C.  Pain rated at X/10    Allergies    No Known Allergies    Intolerances        Vital Signs Last 24 Hrs  T(C): 36.6 (08 Nov 2022 04:44), Max: 36.7 (07 Nov 2022 11:20)  T(F): 97.8 (08 Nov 2022 04:44), Max: 98 (07 Nov 2022 11:20)  HR: 78 (08 Nov 2022 04:44) (78 - 97)  BP: 122/88 (08 Nov 2022 04:44) (110/70 - 124/75)  BP(mean): --  RR: 18 (08 Nov 2022 04:44) (16 - 18)  SpO2: 99% (08 Nov 2022 04:44) (99% - 100%)    Parameters below as of 08 Nov 2022 04:44  Patient On (Oxygen Delivery Method): room air        LABS:                        13.7   6.22  )-----------( 177      ( 08 Nov 2022 08:54 )             40.1     11-08    136  |  100  |  13  ----------------------------<  117<H>  4.4   |  24  |  0.86    Ca    9.5      08 Nov 2022 06:49  Phos  4.3     11-08  Mg     2.1     11-08    TPro  8.2  /  Alb  4.7  /  TBili  0.4  /  DBili  x   /  AST  17  /  ALT  17  /  AlkPhos  103  11-06        CAPILLARY BLOOD GLUCOSE      POCT Blood Glucose.: 136 mg/dL (08 Nov 2022 09:00)  POCT Blood Glucose.: 138 mg/dL (07 Nov 2022 21:49)  POCT Blood Glucose.: 124 mg/dL (07 Nov 2022 15:10)      Lower Extremity Physical Exam:  Vascular: DP/PT 2/4, B/L, CFT <3 seconds B/L, Temperature gradient warm to cool left foot, warm to cool right foot.    Neuro: Epicritic sensation absent to the level of forefoot B/L.  Musculoskeletal/Ortho: No pain on palpation to the left foot, lesser digital contractures  Skin: L dorsal foot redness improved, no warm to touch +1 pitting edema, dorsal 1st interspace I&D w serous drainage, blotchy indurated skin consistent with dermatitis to the midfoot/anterior ankle, no malodor, no tracking, no tunneling. No clinical signs of infection on the right foot       RADIOLOGY & ADDITIONAL TESTS:

## 2022-11-08 NOTE — PROGRESS NOTE ADULT - PROBLEM SELECTOR PLAN 1
Patient with increased redness/swelling of LLE after abrasion to the skin 3 weeks ago  Imaging c/w cellulitis w/o evidence of abscess, osteomyelitis, necrotizing fasciitis  s/p I&D by podiatry  s/p clindamycin, vanc/Zosyn in ED  - Continue with IV antibiotics  -->No prior hospitalizations, history of MRSA, purulence, will do cefazolin (11/7-)  - MRSA/MSSA swab  - Podiatry on board, concerned for allergic reaction/dermatitis  -->Will check procal, if not elevated, will consider prednisone/Benadryl  - Local wound care per podiatry including Bacitracin  - Ambulate as tolerated Patient with increased redness/swelling of LLE after abrasion to the skin 3 weeks ago  Imaging c/w cellulitis w/o evidence of abscess, osteomyelitis, necrotizing fasciitis  s/p I&D by podiatry  s/p clindamycin, vanc/Zosyn in ED  - Continue with IV antibiotics  -->No prior hospitalizations, history of MRSA, purulence, will do cefazolin (11/7-)  - MRSA/MSSA swab  - Podiatry on board, concerned for allergic reaction/dermatitis  -->procal not elevated  -->benadryl and topical betamethasone ordered  - Local wound care per podiatry including Bacitracin  - Ambulate as tolerated

## 2022-11-09 ENCOUNTER — TRANSCRIPTION ENCOUNTER (OUTPATIENT)
Age: 62
End: 2022-11-09

## 2022-11-09 VITALS
SYSTOLIC BLOOD PRESSURE: 110 MMHG | RESPIRATION RATE: 17 BRPM | OXYGEN SATURATION: 98 % | DIASTOLIC BLOOD PRESSURE: 74 MMHG | TEMPERATURE: 98 F | HEART RATE: 79 BPM

## 2022-11-09 LAB
ANION GAP SERPL CALC-SCNC: 14 MMOL/L — SIGNIFICANT CHANGE UP (ref 5–17)
BUN SERPL-MCNC: 18 MG/DL — SIGNIFICANT CHANGE UP (ref 7–23)
CALCIUM SERPL-MCNC: 8.9 MG/DL — SIGNIFICANT CHANGE UP (ref 8.4–10.5)
CHLORIDE SERPL-SCNC: 101 MMOL/L — SIGNIFICANT CHANGE UP (ref 96–108)
CO2 SERPL-SCNC: 22 MMOL/L — SIGNIFICANT CHANGE UP (ref 22–31)
CREAT SERPL-MCNC: 0.89 MG/DL — SIGNIFICANT CHANGE UP (ref 0.5–1.3)
EGFR: 96 ML/MIN/1.73M2 — SIGNIFICANT CHANGE UP
GLUCOSE SERPL-MCNC: 107 MG/DL — HIGH (ref 70–99)
HCT VFR BLD CALC: 40.7 % — SIGNIFICANT CHANGE UP (ref 39–50)
HGB BLD-MCNC: 13.8 G/DL — SIGNIFICANT CHANGE UP (ref 13–17)
MAGNESIUM SERPL-MCNC: 2.2 MG/DL — SIGNIFICANT CHANGE UP (ref 1.6–2.6)
MCHC RBC-ENTMCNC: 30.8 PG — SIGNIFICANT CHANGE UP (ref 27–34)
MCHC RBC-ENTMCNC: 33.9 GM/DL — SIGNIFICANT CHANGE UP (ref 32–36)
MCV RBC AUTO: 90.8 FL — SIGNIFICANT CHANGE UP (ref 80–100)
NRBC # BLD: 0 /100 WBCS — SIGNIFICANT CHANGE UP (ref 0–0)
PHOSPHATE SERPL-MCNC: 4 MG/DL — SIGNIFICANT CHANGE UP (ref 2.5–4.5)
PLATELET # BLD AUTO: 178 K/UL — SIGNIFICANT CHANGE UP (ref 150–400)
POTASSIUM SERPL-MCNC: 4.5 MMOL/L — SIGNIFICANT CHANGE UP (ref 3.5–5.3)
POTASSIUM SERPL-SCNC: 4.5 MMOL/L — SIGNIFICANT CHANGE UP (ref 3.5–5.3)
RBC # BLD: 4.48 M/UL — SIGNIFICANT CHANGE UP (ref 4.2–5.8)
RBC # FLD: 13.3 % — SIGNIFICANT CHANGE UP (ref 10.3–14.5)
SODIUM SERPL-SCNC: 137 MMOL/L — SIGNIFICANT CHANGE UP (ref 135–145)
WBC # BLD: 6.86 K/UL — SIGNIFICANT CHANGE UP (ref 3.8–10.5)
WBC # FLD AUTO: 6.86 K/UL — SIGNIFICANT CHANGE UP (ref 3.8–10.5)

## 2022-11-09 PROCEDURE — 87077 CULTURE AEROBIC IDENTIFY: CPT

## 2022-11-09 PROCEDURE — 73630 X-RAY EXAM OF FOOT: CPT

## 2022-11-09 PROCEDURE — G1004: CPT

## 2022-11-09 PROCEDURE — 82962 GLUCOSE BLOOD TEST: CPT

## 2022-11-09 PROCEDURE — 99285 EMERGENCY DEPT VISIT HI MDM: CPT

## 2022-11-09 PROCEDURE — 36415 COLL VENOUS BLD VENIPUNCTURE: CPT

## 2022-11-09 PROCEDURE — 96375 TX/PRO/DX INJ NEW DRUG ADDON: CPT

## 2022-11-09 PROCEDURE — 87070 CULTURE OTHR SPECIMN AEROBIC: CPT

## 2022-11-09 PROCEDURE — 83036 HEMOGLOBIN GLYCOSYLATED A1C: CPT

## 2022-11-09 PROCEDURE — 84100 ASSAY OF PHOSPHORUS: CPT

## 2022-11-09 PROCEDURE — 87186 SC STD MICRODIL/AGAR DIL: CPT

## 2022-11-09 PROCEDURE — 96365 THER/PROPH/DIAG IV INF INIT: CPT

## 2022-11-09 PROCEDURE — 73706 CT ANGIO LWR EXTR W/O&W/DYE: CPT | Mod: MA

## 2022-11-09 PROCEDURE — 87205 SMEAR GRAM STAIN: CPT

## 2022-11-09 PROCEDURE — 99239 HOSP IP/OBS DSCHRG MGMT >30: CPT | Mod: GC

## 2022-11-09 PROCEDURE — 73720 MRI LWR EXTREMITY W/O&W/DYE: CPT | Mod: ME

## 2022-11-09 PROCEDURE — 83735 ASSAY OF MAGNESIUM: CPT

## 2022-11-09 PROCEDURE — 85027 COMPLETE CBC AUTOMATED: CPT

## 2022-11-09 PROCEDURE — 85652 RBC SED RATE AUTOMATED: CPT

## 2022-11-09 PROCEDURE — 87635 SARS-COV-2 COVID-19 AMP PRB: CPT

## 2022-11-09 PROCEDURE — 80048 BASIC METABOLIC PNL TOTAL CA: CPT

## 2022-11-09 PROCEDURE — G0378: CPT

## 2022-11-09 PROCEDURE — 85025 COMPLETE CBC W/AUTO DIFF WBC: CPT

## 2022-11-09 PROCEDURE — 84145 PROCALCITONIN (PCT): CPT

## 2022-11-09 PROCEDURE — A9585: CPT

## 2022-11-09 RX ORDER — CEPHALEXIN 500 MG
1 CAPSULE ORAL
Qty: 21 | Refills: 0
Start: 2022-11-09 | End: 2022-11-13

## 2022-11-09 RX ADMIN — Medication 100 MILLIGRAM(S): at 12:37

## 2022-11-09 RX ADMIN — CHLORHEXIDINE GLUCONATE 1 APPLICATION(S): 213 SOLUTION TOPICAL at 12:45

## 2022-11-09 RX ADMIN — Medication 100 MILLIGRAM(S): at 00:37

## 2022-11-09 NOTE — PROGRESS NOTE ADULT - PROBLEM SELECTOR PLAN 5
DVT ppx: IMPROVE-Score 1, not indicated, patient mobile  Diet: CC/DASH w/ evening snack  Dispo: Home pending improvement in cellulitis    FULL CODE
DVT ppx: IMPROVE-Score 1, not indicated, patient mobile  Diet: CC/DASH w/ evening snack  Dispo: Home pending improvement in cellulitis    FULL CODE

## 2022-11-09 NOTE — DISCHARGE NOTE PROVIDER - HOSPITAL COURSE
62M PMH T2DM, HTN, HLD presented to the ED for left lower extremity redness & swelling, likely i/s/o LLE cellulitis s/p I&D by podiatry. Imaging without signs of osteomyelitis or necrotizing fasciitis. On IV antibiotics w/ local wound care.    ED Course:  62M PMH T2DM, HTN, HLD presented to the ED for left lower extremity redness & swelling. Per the patient, three weeks ago he had an injury to his left big toe. Since that time, he has had worsening redness & swelling in the left lower extremity to the mid-calf. He did not seek care for this. He used topical Neopsorin without benefit. He came to the ED, as the swelling became out of hand. Denies fevers, chills, shortness of breath, cough, chest pain, abdominal pain, N/V/D.    In the ED, he was afebrile, HDS. Labs revealed elevated ESR 26, CRP 24. XR foot w/o evidence of osteo or gas. CTA lower extremity with good blood flow, no signs of osteo, necrotizing fasciitis, or drainable abscess. Received IV clindamycin, Zosyn, vancomycin x1. Underwent I&D by podiatry, culture sent. Admitted to medicine for further management.    Hospital Course:  Patient given 3 days of IV ancef for management of suspected cellulitis. Per podiatry, concern for mixed allergic/dermatitis reaction so pt also given benadryl and topical betamethasone. Pt clinically showed signs of improvement throughout hospitalization, with decrease in erythema and swelling of LLE. No vascular or neurologic issues in LLE. 62M PMH T2DM, HTN, HLD presented to the ED for left lower extremity redness & swelling, likely i/s/o LLE cellulitis s/p I&D by podiatry. Imaging without signs of osteomyelitis or necrotizing fasciitis. On IV antibiotics w/ local wound care.    ED Course:  62M PMH T2DM, HTN, HLD presented to the ED for left lower extremity redness & swelling. Per the patient, three weeks ago he had an injury to his left big toe. Since that time, he has had worsening redness & swelling in the left lower extremity to the mid-calf. He did not seek care for this. He used topical Neopsorin without benefit. He came to the ED, as the swelling became out of hand. Denies fevers, chills, shortness of breath, cough, chest pain, abdominal pain, N/V/D.    In the ED, he was afebrile, HDS. Labs revealed elevated ESR 26, CRP 24. XR foot w/o evidence of osteo or gas. CTA lower extremity with good blood flow, no signs of osteo, necrotizing fasciitis, or drainable abscess. Received IV clindamycin, Zosyn, vancomycin x1. Underwent I&D by podiatry, culture sent. Admitted to medicine for further management.    Hospital Course:  Patient given 3 days of IV ancef for management of suspected cellulitis. Per podiatry, concern for mixed allergic/dermatitis reaction so pt also given benadryl and topical betamethasone. Pt's xray showed no gas and no OM.  Prelim wound cultures showed no growth. Left foot MRI showed no abscess or sign of OM. Pt clinically showed signs of improvement throughout hospitalization, with decrease in erythema and swelling of LLE. No vascular or neurologic issues in LLE. All vital signs, cbc and bmp remained within normal limits throughout hospitalization. Pt was continued on his home lisinopril and simvastatin dosing. For diabetic management, pt was on ISS. Per podiatry, pt is stable for discharge and is to follow up with podiatry outpatient within 1 week.

## 2022-11-09 NOTE — DISCHARGE NOTE PROVIDER - NSDCCPCAREPLAN_GEN_ALL_CORE_FT
PRINCIPAL DISCHARGE DIAGNOSIS  Diagnosis: Cellulitis  Assessment and Plan of Treatment: You were in the hospital for a suspected infection in your leg. You were given a few different antibiotics to help with this infection. You were seen by the foot doctors (the podiatry team). They cleaned the wound on your leg and changed your dressing every day. They also recommended we use a cream on your wound, in case this could have been an allergic reaction. The redness and swelling in your leg got better every day you were hospitalized. Please continue to apply the cream on your foot, and make sure to keep the area clean. Please continue taking your antibiotics every 6 hours for 4 more days. Please follow up with the foot doctors within one week.   Please see a doctor or return to emergency room if your leg gets worse, you develop fever, chills, or any other worsening of your symptoms.       PRINCIPAL DISCHARGE DIAGNOSIS  Diagnosis: Cellulitis  Assessment and Plan of Treatment: You were in the hospital for a suspected infection in your leg. You were given a few different antibiotics to help with this infection. You were seen by the foot doctors (the podiatry team). They cleaned the wound on your leg and changed your dressing every day. They also recommended we use a cream on your wound, in case this could have been an allergic reaction. The redness and swelling in your leg got better every day you were hospitalized. Please continue to apply the cream on your foot, and make sure to keep the area clean. Please continue taking your antibiotics every 6 hours for 4 more days. Please follow up with the foot doctors within one week.   Please see a doctor or return to emergency room if your leg gets worse, you develop fever, chills, or any other worsening of your symptoms.  Estabas en el hospital por ba infecion en el pie. Por esto, te dimos antibioticos por IV. Ahora puedes regresar a casa con antibiotics por la boca. Mandamos un antibiotico a la farmacia. Es importante que tomes bryant antibiotico cada do. Si tienes fiebre or si el color de tu pie empeora, llame a tu doctor primario.

## 2022-11-09 NOTE — PROGRESS NOTE ADULT - ASSESSMENT
62 y.o M with left dorsal foot possible dermatitis   - Pt seen and evaluated  - Afebrile, no leukocytosis, ESR 26, CRP 24  - L dorsal foot redness, no warm to touch +1 pitting edema, dorsal 1st interspace I&D w serous drainage expressed, no malodor, no tracking, no tunneling. No clinical signs of infection on the right foot   - Left foot xray: no gas, no OM   - Left foot wound culture pending  - Continue Vanco/zosyn  - Recommend one dose of prednisone   - Recommend Benadryl   - Rx mupirocin  - Left foot MRI canceled 2/2 to no clinical signs of infection   - Pod plan local wound care pending clinical improvement   - Seen with attending 
62M PMH T2DM, HTN, HLD presented to the ED for left lower extremity redness & swelling, likely i/s/o LLE cellulitis s/p I&D by podiatry. Imaging without signs of osteomyelitis or necrotizing fasciitis. On IV antibiotics w/ local wound care pending MRI.
62 y.o M with left dorsal foot possible dermatitis   - Pt seen and evaluated  - Afebrile, no leukocytosis, ESR 26, CRP 24  - L dorsal foot redness markedly improved, no warm to touch +1 pitting edema, dorsal 1st interspace I&D w no drainage, mild blotchy indurated skin consistent with dermatitis to the midfoot/anterior ankle, no malodor, no tracking, no tunneling. No clinical signs of infection on the right foot   - Left foot xray: no gas, no OM   - Left foot wound culture no growth prelim   - Left foot MRI: no OM, no abscess   - Continue topical betamethasone for dorsal midfoot/ankle application followed by DSD daily   - Patient is stable for discharge from podiatry standpoint   - F/u information and instructions can be found in the f/u section of d/c provider note   - Discussed with attending 
62 y.o M with left dorsal foot possible dermatitis   - Pt seen and evaluated  - Afebrile, no leukocytosis, ESR 26, CRP 24  -  L dorsal foot redness improved, no warm to touch +1 pitting edema, dorsal 1st interspace I&D w serous drainage, blotchy indurated skin consistent with dermatitis to the midfoot/anterior ankle, no malodor, no tracking, no tunneling. No clinical signs of infection on the right foot   - Left foot xray: no gas, no OM   - Left foot wound culture pending  - Left foot MRI: no OM, no abscess   - Ordered topical betamethasone for dorsal midfoot/ankle application followed by DSD daily   - Recommend Benadryl   - Pod plan local wound care pending clinical improvement   - Seen with attending 
62M PMH T2DM, HTN, HLD presented to the ED for left lower extremity redness & swelling, likely i/s/o LLE cellulitis s/p I&D by podiatry. Imaging without signs of osteomyelitis or necrotizing fasciitis. On IV antibiotics w/ local wound care pending MRI.

## 2022-11-09 NOTE — PROGRESS NOTE ADULT - ATTENDING COMMENTS
Seen at bedside.  Does not appear to be soley infectious in nature.  Swelling, slightly cool, blotchy indurated lesions possible more of a dermatitis.  Patient denies any new contacts, although he does relate using Gorilla Glue on his hallux nail when it cracked and lifted.  Rec benadryl and topical steroid trial
Seen at bedside.  Site responding to treatment.  No acute intervention planned.  stable for outpt followup
Patient seen at bedside.  Discussed etiology of condition. appearance of foot is improving.  Would still recommend possible trial of steroid/ antihistamine as possible  allergic reaction as redness is more blotchy to the ankle and  from the foot /toe. will continue to monitor.  No acute intervention plan at this time.
-Left foot cellulitis improving. Will DC on oral cephalexin to complete total 7 day course of abx. -Outpatient podiatry and PMD f/u advised. -35 minutes spent on the DC process.
-Left foot cellulitis appreciated. C/w cefazolin another day inpatient for continued improvement. -No OM on MRI. -Likely DC tomorrow on PO abx if still improving.   -Pods recs benadryl and topical steroids.

## 2022-11-09 NOTE — PROGRESS NOTE ADULT - SUBJECTIVE AND OBJECTIVE BOX
Patient is a 62y old  Male who presents with a chief complaint of Cellulitis (08 Nov 2022 10:43)       INTERVAL HPI/OVERNIGHT EVENTS:  Patient seen and evaluated at bedside.  Pt is resting comfortable in NAD. Denies N/V/F/C.  Pain rated at X/10    Allergies    No Known Allergies    Intolerances        Vital Signs Last 24 Hrs  T(C): 36.6 (09 Nov 2022 04:13), Max: 37.1 (08 Nov 2022 20:42)  T(F): 97.8 (09 Nov 2022 04:13), Max: 98.7 (08 Nov 2022 20:42)  HR: 99 (09 Nov 2022 04:13) (86 - 108)  BP: 98/63 (09 Nov 2022 04:13) (98/63 - 107/71)  BP(mean): --  RR: 18 (09 Nov 2022 04:13) (18 - 18)  SpO2: 99% (09 Nov 2022 04:13) (95% - 99%)    Parameters below as of 09 Nov 2022 04:13  Patient On (Oxygen Delivery Method): room air        LABS:                        13.8   6.86  )-----------( 178      ( 09 Nov 2022 07:20 )             40.7     11-09    137  |  101  |  18  ----------------------------<  107<H>  4.5   |  22  |  0.89    Ca    8.9      09 Nov 2022 07:18  Phos  4.0     11-09  Mg     2.2     11-09          CAPILLARY BLOOD GLUCOSE      POCT Blood Glucose.: 142 mg/dL (08 Nov 2022 21:59)  POCT Blood Glucose.: 163 mg/dL (08 Nov 2022 17:32)  POCT Blood Glucose.: 212 mg/dL (08 Nov 2022 16:37)  POCT Blood Glucose.: 138 mg/dL (08 Nov 2022 13:52)      Lower Extremity Physical Exam:  Vascular: DP/PT 2/4, B/L, CFT <3 seconds B/L, Temperature gradient warm to cool left foot, warm to cool right foot.    Neuro: Epicritic sensation absent to the level of forefoot B/L.  Musculoskeletal/Ortho: No pain on palpation to the left foot, lesser digital contractures  Skin: L dorsal foot redness markedly improved, no warm to touch +1 pitting edema, dorsal 1st interspace I&D w no drainage, mild blotchy indurated skin consistent with dermatitis to the midfoot/anterior ankle, no malodor, no tracking, no tunneling. No clinical signs of infection on the right foot     RADIOLOGY & ADDITIONAL TESTS:

## 2022-11-09 NOTE — DISCHARGE NOTE NURSING/CASE MANAGEMENT/SOCIAL WORK - NSDCFUADDAPPT_GEN_ALL_CORE_FT
Podiatry Discharge Instructions:  Follow up: Please follow up with Dr. Álvarez within 1 week of discharge from the hospital, please call [phone] for appointment and discuss that you recently were seen in the hospital.  Wound Care: Please apply a thin layer of betamethasone topical cream to the left ankle and foot followed by 4x4 gauze and julio daily.   Weight bearing: Please weight bear as tolerated in a surgical shoe.  Antibiotics: Please continue as instructed.    Haz ba candy con Dr. Álvarez, el podologo. [facility] [phone] para hacer la candy. Applica la crema betamethasone al pie cada do.     judith Ahuja ba candy en la clinica en [address] [place] [address]. [facility] [phone] para hacer la candy.

## 2022-11-09 NOTE — DISCHARGE NOTE NURSING/CASE MANAGEMENT/SOCIAL WORK - PATIENT PORTAL LINK FT
You can access the FollowMyHealth Patient Portal offered by [facility] by registering at the following website: [email or web address]. By joining [employer]’s FollowMyHealth portal, you will also be able to view your health information using other applications (apps) compatible with our system.

## 2022-11-09 NOTE — DISCHARGE NOTE PROVIDER - NSDCMRMEDTOKEN_GEN_ALL_CORE_FT
lisinopril 10 mg oral tablet: 1 tab(s) orally once a day  metFORMIN 500 mg oral tablet, extended release: 1 tab(s) orally 2 times a day  simvastatin 20 mg oral tablet: 1 tab(s) orally once a day (at bedtime)   cephalexin 500 mg oral tablet: 1 tab(s) orally 4 times a day     [place] dosis por la noche en el nueve de noviembre.  lisinopril 10 mg oral tablet: 1 tab(s) orally once a day  metFORMIN 500 mg oral tablet, extended release: 1 tab(s) orally 2 times a day  simvastatin 20 mg oral tablet: 1 tab(s) orally once a day (at bedtime)

## 2022-11-09 NOTE — PROGRESS NOTE ADULT - PROBLEM/PLAN-1
Fluids running. Pt educated on NPO status at this time, verbalizes understanding.   
DISPLAY PLAN FREE TEXT
DISPLAY PLAN FREE TEXT

## 2022-11-09 NOTE — PROGRESS NOTE ADULT - PROBLEM SELECTOR PLAN 1
Patient with increased redness/swelling of LLE after abrasion to the skin 3 weeks ago  Imaging c/w cellulitis w/o evidence of abscess, osteomyelitis, necrotizing fasciitis  s/p I&D by podiatry  s/p clindamycin, vanc/Zosyn in ED  - Continue with IV antibiotics  -->No prior hospitalizations, history of MRSA, purulence, will do cefazolin (11/7-)  - MRSA/MSSA swab  - Podiatry on board, concerned for allergic reaction/dermatitis  -->procal not elevated  -->benadryl and topical betamethasone ordered  - Local wound care per podiatry including Bacitracin  - Ambulate as tolerated

## 2022-11-09 NOTE — DISCHARGE NOTE NURSING/CASE MANAGEMENT/SOCIAL WORK - NSDCPEFALRISK_GEN_ALL_CORE
For information on Fall & Injury Prevention, visit: [email or web address] OR  [email or web address] OR  https://www.cdc.gov/steadi/patient.html
Airway patent, nasal mucosa clear, mouth with normal mucosa. Throat has no vesicles, no oropharyngeal exudates and uvula is midline. Clear tympanic membranes bilaterally.

## 2022-11-09 NOTE — DISCHARGE NOTE PROVIDER - CARE PROVIDER_API CALL
Matt Sarkar)  Internal Medicine  [address] [facility], [facility] [address]  [place] [address]  Phone: [phone]  Fax: [phone]  Established Patient  Follow Up Time: 1 week

## 2022-11-09 NOTE — DISCHARGE NOTE PROVIDER - NSDCFUADDAPPT_GEN_ALL_CORE_FT
Podiatry Discharge Instructions:  Follow up: Please follow up with Dr. Álvarez within 1 week of discharge from the hospital, please call [phone] for appointment and discuss that you recently were seen in the hospital.  Wound Care: Please apply a thin layer of betamethasone topical cream to the right ankle and foot followed by 4x4 gauze and julio daily.   Weight bearing: Please weight bear as tolerated in a surgical shoe.  Antibiotics: Please continue as instructed. Podiatry Discharge Instructions:  Follow up: Please follow up with Dr. Ávlarez within 1 week of discharge from the hospital, please call [phone] for appointment and discuss that you recently were seen in the hospital.  Wound Care: Please apply a thin layer of betamethasone topical cream to the left ankle and foot followed by 4x4 gauze and julio daily.   Weight bearing: Please weight bear as tolerated in a surgical shoe.  Antibiotics: Please continue as instructed. Podiatry Discharge Instructions:  Follow up: Please follow up with Dr. Álvarez within 1 week of discharge from the hospital, please call [phone] for appointment and discuss that you recently were seen in the hospital.  Wound Care: Please apply a thin layer of betamethasone topical cream to the left ankle and foot followed by 4x4 gauze and julio daily.   Weight bearing: Please weight bear as tolerated in a surgical shoe.  Antibiotics: Please continue as instructed.    Haz ba candy con Dr. Álvarez, el podologo. [facility] [phone] para hacer la candy. Applica la crema betamethasone al pie cada do.     judith Ahuja ba candy en la clinica en [address] [place] [address]. [facility] [phone] para hacer la candy.

## 2022-11-10 LAB
-  AMPICILLIN/SULBACTAM: SIGNIFICANT CHANGE UP
-  CEFAZOLIN: SIGNIFICANT CHANGE UP
-  CLINDAMYCIN: SIGNIFICANT CHANGE UP
-  ERYTHROMYCIN: SIGNIFICANT CHANGE UP
-  GENTAMICIN: SIGNIFICANT CHANGE UP
-  OXACILLIN: SIGNIFICANT CHANGE UP
-  PENICILLIN: SIGNIFICANT CHANGE UP
-  RIFAMPIN: SIGNIFICANT CHANGE UP
-  TETRACYCLINE: SIGNIFICANT CHANGE UP
-  TRIMETHOPRIM/SULFAMETHOXAZOLE: SIGNIFICANT CHANGE UP
-  VANCOMYCIN: SIGNIFICANT CHANGE UP
METHOD TYPE: SIGNIFICANT CHANGE UP

## 2022-11-12 LAB
CULTURE RESULTS: SIGNIFICANT CHANGE UP
ORGANISM # SPEC MICROSCOPIC CNT: SIGNIFICANT CHANGE UP
ORGANISM # SPEC MICROSCOPIC CNT: SIGNIFICANT CHANGE UP
SPECIMEN SOURCE: SIGNIFICANT CHANGE UP

## 2022-11-14 ENCOUNTER — NON-APPOINTMENT (OUTPATIENT)
Age: 62
End: 2022-11-14

## 2022-11-15 ENCOUNTER — OUTPATIENT (OUTPATIENT)
Dept: OUTPATIENT SERVICES | Facility: HOSPITAL | Age: 62
LOS: 1 days | End: 2022-11-15
Payer: MEDICAID

## 2022-11-15 ENCOUNTER — APPOINTMENT (OUTPATIENT)
Dept: INTERNAL MEDICINE | Facility: CLINIC | Age: 62
End: 2022-11-15

## 2022-11-15 VITALS
SYSTOLIC BLOOD PRESSURE: 118 MMHG | HEIGHT: 66 IN | OXYGEN SATURATION: 98 % | WEIGHT: 186 LBS | DIASTOLIC BLOOD PRESSURE: 78 MMHG | HEART RATE: 99 BPM | BODY MASS INDEX: 29.89 KG/M2

## 2022-11-15 DIAGNOSIS — L25.9 UNSPECIFIED CONTACT DERMATITIS, UNSPECIFIED CAUSE: ICD-10-CM

## 2022-11-15 DIAGNOSIS — L03.116 CELLULITIS OF LEFT LOWER LIMB: ICD-10-CM

## 2022-11-15 DIAGNOSIS — I10 ESSENTIAL (PRIMARY) HYPERTENSION: ICD-10-CM

## 2022-11-15 PROCEDURE — G0463: CPT

## 2022-11-15 PROCEDURE — 99496 TRANSJ CARE MGMT HIGH F2F 7D: CPT

## 2022-11-15 RX ORDER — HYDROCORTISONE 25 MG/G
2.5 OINTMENT TOPICAL TWICE DAILY
Qty: 1 | Refills: 3 | Status: ACTIVE | COMMUNITY
Start: 2022-11-15 | End: 1900-01-01

## 2022-11-15 NOTE — ASSESSMENT
[FreeTextEntry1] : 61 y/o M with h/o DM, HTN, HL here for post-discharge follow up after admission for LLE cellultiis (11/7-11/9) s/p abx.\par \par LLE cellulitis - clinically improved; s/p abx\par - Would continue to monitor area\par - DIscussed proper foot care\par - F/U as scheduled with Podiatry on 11/22.\par \par Bilateral axillary rash - appears consistent with candidal intertrigo\par - Topical nystatin\par \par Contact dermatitis - on back, arms, likely due to IV adhesive, etc.\par - Topical hydrocortisone\par \par HCM:\par - Received flu vaccine 8/2022\par \par RTC in 3 months for f/u of chronic medical issues or sooner prn.

## 2022-11-15 NOTE — HISTORY OF PRESENT ILLNESS
[Post-hospitalization from ___ Hospital] : Post-hospitalization from [unfilled] Hospital [Admitted on: ___] : The patient was admitted on [unfilled] [Discharged on ___] : discharged on [unfilled] [Discharge Summary] : discharge summary [Pertinent Labs] : pertinent labs [Radiology Findings] : radiology findings [Discharge Med List] : discharge medication list [Med Reconciliation] : medication reconciliation has been completed [Patient Contacted By: ____] : and contacted by [unfilled] [FreeTextEntry2] : \par Initially hurt his nail on the 1st toe (left) a few weeks ago then also had a scratch on his L foot and developed an infection. Eventually entire foot got swollen. Was applying hydrogen peroxide, neosporin, etc. but gradually worsened. \par Went to the ED had I+D by podiatry\par Xrays and MRIs which were fine.\par No fevers. No chills. No other systemic symptoms.\par \par Since discharge on 11/9, foot has been ok, cleaning on a daily basis. Much improved since admission\par Completed cephalexin course yesterday.\par \par Rash on arms, arm pits, and back. Itchy. Not applying anything to it.\par \par DM - has been well controlled. Last A1c 6.2.\par \par -------\par Patient REILLY AMOS MRN [id number] Hospital Visit [id number] [facility] - Attending Physician Jayson Liu \par Status Complete \par  \par \par Hospital Course: \par Discharge Date	09-Nov-2022 \par Admission Date	07-Nov-2022 07:38 \par Reason for Admission	Cellulitis \par Hospital Course	 \par 62M PMH T2DM, HTN, HLD presented to the ED for left lower extremity redness & \par swelling, likely i/s/o LLE cellulitis s/p I&D by podiatry. Imaging without \par signs of osteomyelitis or necrotizing fasciitis. On IV antibiotics w/ local \par wound care. \par \par ED Course: \par 62M PMH T2DM, HTN, HLD presented to the ED for left lower extremity redness & \par swelling. Per the patient, three weeks ago he had an injury to his left big \par toe. Since that time, he has had worsening redness & swelling in the left lower \par extremity to the mid-calf. He did not seek care for this. He used topical \par Neopsorin without benefit. He came to the ED, as the swelling became out of \par hand. Denies fevers, chills, shortness of breath, cough, chest pain, abdominal \par pain, N/V/D. \par \par In the ED, he was afebrile, HDS. Labs revealed elevated ESR 26, CRP 24. XR foot \par w/o evidence of osteo or gas. CTA lower extremity with good blood flow, no \par signs of osteo, necrotizing fasciitis, or drainable abscess. Received IV \par clindamycin, Zosyn, vancomycin x1. Underwent I&D by podiatry, culture sent. \par Admitted to medicine for further management. \par \par Hospital Course: \par Patient given 3 days of IV ancef for management of suspected cellulitis. Per \par podiatry, concern for mixed allergic/dermatitis reaction so pt also given \par benadryl and topical betamethasone. Pt's xray showed no gas and no OM.  Prelim \par wound cultures showed no growth. Left foot MRI showed no abscess or sign of OM. \par Pt clinically showed signs of improvement throughout hospitalization, with \par decrease in erythema and swelling of LLE. No vascular or neurologic issues in \par LLE. All vital signs, cbc and bmp remained within normal limits throughout \par hospitalization. Pt was continued on his home lisinopril and simvastatin \par dosing. For diabetic management, pt was on ISS. Per podiatry, pt is stable for \par discharge and is to follow up with podiatry outpatient within 1 week. \par \par Med Reconciliation: \par Override IMPROVE-DD recommendations due to:	IMPROVE-DD Application Not \par Available \par Recommended Post-Discharge VTE Prophylaxis	IMPROVE-DD Application Not Available \par Medication Reconciliation Status	Admission Reconciliation is Completed \par Discharge Reconciliation is Completed \par \par Discharge Medications	cephalexin 500 mg oral tablet: 1 tab(s) orally 4 times a \par day \par \par [place] dosis por la noche en el nueve de [facility]. \par lisinopril 10 mg oral tablet: 1 tab(s) orally once a day \par metFORMIN 500 mg oral tablet, extended release: 1 tab(s) orally 2 times a day \par simvastatin 20 mg oral tablet: 1 tab(s) orally once a day (at bedtime) \par \par , \par , \par \par Care Plan/Procedures: \par Discharge Diagnoses, Assessment and Plan of Treatment	PRINCIPAL DISCHARGE \par DIAGNOSIS \par Diagnosis: Cellulitis \par Assessment and Plan of Treatment: You were in the hospital for a suspected \par infection in your leg. You were given a few different antibiotics to help with \par this infection. You were seen by the foot doctors (the podiatry team). They \par cleaned the wound on your leg and changed your dressing every day. They also \par recommended we use a cream on your wound, in case this could have been an \par allergic reaction. The redness and swelling in your leg got better every day \par you were hospitalized. Please continue to apply the cream on your foot, and \par make sure to keep the area clean. Please continue taking your antibiotics every \par 6 hours for 4 more days. Please follow up with the foot doctors within one \par week. \par Please see a doctor or return to emergency room if your leg gets worse, you \par develop fever, chills, or any other worsening of your symptoms. \par Estabas en el hospital por ba infecion en el pie. Por esto, te dimos \par antibioticos por IV. Ahora puedes regresar a casa con antibiotics por la boca. \par Mandamos un antibiotico a la farmacia. Es importante que tomes bryant antibiotico \par cada do. Si tienes fiebre or si el color de tu pie empeora, llame a tu doctor \par primario. \par Goal(s)	To get better and follow your care plan as instructed. \par \par Follow Up: \par Care Providers for Follow up (PCP/Outpatient Provider)	Matt Sarkar) \par Internal Medicine \par [address] [place] [address], [address] \[place] [address] \par Phone: [phone] \par Fax: [phone] \par Established Patient \par Follow Up Time: 1 week \par Additional Scheduled Appointments	Podiatry Discharge Instructions: \par Follow up: Please follow up with Dr. Álvarez within 1 week of discharge from the \[facility] hospital, please call [phone] for appointment and discuss that you \par recently were seen in the hospital. \par Wound Care: Please apply a thin layer of betamethasone topical cream to the \par left ankle and foot followed by 4x4 gauze and julio daily. \par Weight bearing: Please weight bear as tolerated in a surgical shoe. \par Antibiotics: Please continue as instructed. \par \par Haz ba candy con Dr. Álvarez, el podologo. [place] [phone] para hacer la \par candy. Applica la crema betamethasone al pie cada do. \par \par Tambien, haz ba candy en la clinica en [address] [place] [address]. [place] \par [phone] para hacer la candy. \par Discharge Diet	Regular Diet - No restrictions \par Activity	No restrictions \par \par Quality Measures: \par Patient Condition	Stable \par Hospice Patient	No \par Does the patient have difficulty running errands alone like visiting a doctors \par office or shopping?	No \par Does the patient have difficulty climbing stairs?	No \par Cognition: The patient has	No difficulties \par Does the patient have a principal diagnosis of ischemic stroke, hemorrhagic \par stroke, or TIA?	No \par Does the patient have a principal diagnosis of Acute Myocardial Infarction?	No \par Has the patient had a Percutaneous Coronary Intervention?	No \par Did the Patient Present With or was Treated for Malnutrition During This \par Admission	No \par \par Document Complete: \par Care Provider Seen in Hospital	[facility] Team 4 Residents \par Physician Section Complete	This document is complete and the patient is ready \par for discharge. \par For questions about your prescriptions, please call:	[phone] \par Is this contact telephone number correct?	Yes \par Attending Attestation Statement	I have personally seen and examined the \par patient. I have collaborated with and supervised the \par .	on the discharge service for the patient. I have reviewed and made amendments \par to the documentation where necessary. \par \par \par \par Electronic Signatures: \par Reuben Jasmine)  (Signed 09-Nov-2022 13:30) \par 	Authored: Hospital Course, Med Reconciliation, Care Plan/Procedures, Follow \par Up, Quality Measures, Document Complete \par Marlyn Espinoza)  (Signed 09-Nov-2022 09:09) \par 	Authored: Hospital Course, Med Reconciliation, Care Plan/Procedures, Follow \par Up, Quality Measures, Document Complete \par Jayson Liu)  (Signed 09-Nov-2022 09:35) \par 	Co-Signer: Hospital Course, Med Reconciliation, Care Plan/Procedures, Follow \par Up, Quality Measures, Covid Information, Document Complete \par Jenny Barnes)  (Signed 09-Nov-2022 11:45) \par 	Authored: Hospital Course, Care Plan/Procedures, Follow Up \par \par \par Last Updated: 09-Nov-2022 13:30 by Reuben Jasmine) \par

## 2022-11-15 NOTE — PHYSICAL EXAM
[Normal] : soft, non-tender, non-distended, no masses palpated, no HSM and normal bowel sounds [de-identified] : 1+ DP pulses bilaterally [de-identified] : Dry scaling on bilateral feet. No warm/erythema. L foot with healing abrasion. L 1st toenail discolored and broken. No active erythema/tenderness/fluctuance. In bilateral axilla, erythematous patch. On back and arms scattered erythematous papules. ERythematous papules in R forearm in rectangular  [de-identified] : Intact sensation to pin prick bilaterally

## 2022-12-21 NOTE — PROGRESS NOTE ADULT - PROBLEM SELECTOR PROBLEM 4
Hyperlipidemia
Hyperlipidemia
A-T Advancement Flap Text: Given the location of the defect, inherent tension at the surgical site, and the proximity to free margins an A to T advancement flap was deemed most appropriate for wound reconstruction. The risks, benefits, and possible outcomes of this procedure were discussed along with the risks, benefits, and possible outcomes of other options for wound repair (including but not limited to: complex closure, other flaps, grafts, and second intention). The patient verbalized understanding and consent to the outlined procedure. The patient verbalized understanding that intraoperative conditions may necessitate a change in the outlined procedure resulting in modification of the original surgical plan. This decision may be made at the discretion of the surgeon, and is due to factors subject to change or that are difficult to predict preoperatively. Using a sterile surgical marker, an appropriate A to T advancement flap was drawn incorporating the defect and placing the expected incisions within the relaxed skin tension lines where possible. The surgical site and surrounding skin were prepped and draped in sterile fashion.  Standing cones were removed from opposite ends of the defect within the appropriate surgical plane, repositioning as necessary based upon local tension, proximity to free margins/other anatomic structures, and position of the relaxed skin tension lines. The shape of one standing cone was modified, taking two smaller standing cones (each approximately half the size of the traditional standing cone) and repositioning them along opposite ends of a lateral plane (with respect to the defect).  The resulting defect was undermined widely and bilaterally in the appropriate surgical plane taking care to preserve local arteries, veins, nerves, and other structures of anatomic importance. This created a sliding flap capable of advancing across the defect to close the wound under minimal tension. Hemostasis was achieved and then the wound was sutured in layered fashion.

## 2023-04-12 NOTE — H&P ADULT - NSICDXPASTMEDICALHX_GEN_ALL_CORE_FT
Current some day smoker
PAST MEDICAL HISTORY:  Depression     Diabetes     HTN (hypertension)     Hyperlipidemia

## 2023-11-22 ENCOUNTER — APPOINTMENT (OUTPATIENT)
Dept: INTERNAL MEDICINE | Facility: CLINIC | Age: 63
End: 2023-11-22
Payer: MEDICAID

## 2023-11-22 ENCOUNTER — MED ADMIN CHARGE (OUTPATIENT)
Age: 63
End: 2023-11-22

## 2023-11-22 ENCOUNTER — OUTPATIENT (OUTPATIENT)
Dept: OUTPATIENT SERVICES | Facility: HOSPITAL | Age: 63
LOS: 1 days | End: 2023-11-22
Payer: MEDICAID

## 2023-11-22 VITALS
SYSTOLIC BLOOD PRESSURE: 116 MMHG | BODY MASS INDEX: 30.86 KG/M2 | OXYGEN SATURATION: 97 % | WEIGHT: 192 LBS | HEART RATE: 100 BPM | DIASTOLIC BLOOD PRESSURE: 70 MMHG | HEIGHT: 66 IN

## 2023-11-22 DIAGNOSIS — E11.9 TYPE 2 DIABETES MELLITUS W/OUT COMPLICATIONS: ICD-10-CM

## 2023-11-22 DIAGNOSIS — I10 ESSENTIAL (PRIMARY) HYPERTENSION: ICD-10-CM

## 2023-11-22 DIAGNOSIS — B37.2 CANDIDIASIS OF SKIN AND NAIL: ICD-10-CM

## 2023-11-22 DIAGNOSIS — Z00.00 ENCOUNTER FOR GENERAL ADULT MEDICAL EXAMINATION W/OUT ABNORMAL FINDINGS: ICD-10-CM

## 2023-11-22 DIAGNOSIS — G47.33 OBSTRUCTIVE SLEEP APNEA (ADULT) (PEDIATRIC): ICD-10-CM

## 2023-11-22 DIAGNOSIS — Z23 ENCOUNTER FOR IMMUNIZATION: ICD-10-CM

## 2023-11-22 DIAGNOSIS — E78.5 HYPERLIPIDEMIA, UNSPECIFIED: ICD-10-CM

## 2023-11-22 LAB — HBA1C MFR BLD HPLC: 6.8

## 2023-11-22 PROCEDURE — 99397 PER PM REEVAL EST PAT 65+ YR: CPT | Mod: 25,GE

## 2023-11-22 PROCEDURE — G0439: CPT | Mod: 25

## 2023-11-22 PROCEDURE — 85027 COMPLETE CBC AUTOMATED: CPT

## 2023-11-22 PROCEDURE — 80061 LIPID PANEL: CPT

## 2023-11-22 PROCEDURE — 83036 HEMOGLOBIN GLYCOSYLATED A1C: CPT

## 2023-11-22 PROCEDURE — 84443 ASSAY THYROID STIM HORMONE: CPT

## 2023-11-22 PROCEDURE — 80053 COMPREHEN METABOLIC PANEL: CPT

## 2023-11-22 RX ORDER — CICLOPIROX OLAMINE 7.7 MG/G
0.77 CREAM TOPICAL
Qty: 1 | Refills: 0 | Status: ACTIVE | COMMUNITY
Start: 2023-11-22 | End: 1900-01-01

## 2023-11-22 RX ORDER — NYSTATIN 100000 U/G
100000 OINTMENT TOPICAL
Qty: 15 | Refills: 0 | Status: DISCONTINUED | COMMUNITY
Start: 2022-11-15 | End: 2023-11-22

## 2023-11-22 RX ORDER — ACETAMINOPHEN 325 MG/1
325 TABLET, FILM COATED ORAL EVERY 6 HOURS
Qty: 240 | Refills: 0 | Status: DISCONTINUED | COMMUNITY
Start: 2019-01-20 | End: 2023-11-22

## 2023-11-22 RX ORDER — IBUPROFEN 400 MG/1
400 TABLET, FILM COATED ORAL EVERY 8 HOURS
Qty: 90 | Refills: 0 | Status: DISCONTINUED | COMMUNITY
Start: 2019-01-17 | End: 2023-11-22

## 2023-11-22 RX ORDER — ATORVASTATIN CALCIUM 40 MG/1
40 TABLET, FILM COATED ORAL
Qty: 90 | Refills: 2 | Status: ACTIVE | COMMUNITY
Start: 2018-06-18 | End: 1900-01-01

## 2023-11-24 PROBLEM — G47.33 OBSTRUCTIVE SLEEP APNEA ON CPAP: Status: ACTIVE | Noted: 2021-08-11

## 2023-11-24 PROBLEM — I10 BENIGN ESSENTIAL HYPERTENSION: Status: ACTIVE | Noted: 2018-02-01

## 2023-11-24 PROBLEM — Z23 ENCOUNTER FOR IMMUNIZATION: Status: ACTIVE | Noted: 2020-12-18

## 2023-11-24 LAB
ALBUMIN SERPL ELPH-MCNC: 4.9 G/DL
ALP BLD-CCNC: 113 U/L
ALT SERPL-CCNC: 24 U/L
ANION GAP SERPL CALC-SCNC: 12 MMOL/L
AST SERPL-CCNC: 23 U/L
BILIRUB SERPL-MCNC: 0.7 MG/DL
BUN SERPL-MCNC: 13 MG/DL
CALCIUM SERPL-MCNC: 9.7 MG/DL
CHLORIDE SERPL-SCNC: 99 MMOL/L
CHOLEST SERPL-MCNC: 153 MG/DL
CO2 SERPL-SCNC: 24 MMOL/L
CREAT SERPL-MCNC: 0.92 MG/DL
EGFR: 92 ML/MIN/1.73M2
ESTIMATED AVERAGE GLUCOSE: 143 MG/DL
GLUCOSE SERPL-MCNC: 161 MG/DL
HBA1C MFR BLD HPLC: 6.6 %
HCT VFR BLD CALC: 43.8 %
HDLC SERPL-MCNC: 73 MG/DL
HGB BLD-MCNC: 14.9 G/DL
LDLC SERPL CALC-MCNC: 58 MG/DL
MCHC RBC-ENTMCNC: 31.8 PG
MCHC RBC-ENTMCNC: 34 GM/DL
MCV RBC AUTO: 93.6 FL
NONHDLC SERPL-MCNC: 81 MG/DL
PLATELET # BLD AUTO: 185 K/UL
POTASSIUM SERPL-SCNC: 4.7 MMOL/L
PROT SERPL-MCNC: 8.2 G/DL
RBC # BLD: 4.68 M/UL
RBC # FLD: 13.3 %
SODIUM SERPL-SCNC: 134 MMOL/L
TRIGL SERPL-MCNC: 130 MG/DL
TSH SERPL-ACNC: 2.17 UIU/ML
WBC # FLD AUTO: 6.83 K/UL

## 2023-11-28 PROBLEM — B37.2 CANDIDAL INTERTRIGO: Status: ACTIVE | Noted: 2022-11-15

## 2023-11-30 DIAGNOSIS — E78.5 HYPERLIPIDEMIA, UNSPECIFIED: ICD-10-CM

## 2023-11-30 DIAGNOSIS — Z00.00 ENCOUNTER FOR GENERAL ADULT MEDICAL EXAMINATION WITHOUT ABNORMAL FINDINGS: ICD-10-CM

## 2023-11-30 DIAGNOSIS — E11.9 TYPE 2 DIABETES MELLITUS WITHOUT COMPLICATIONS: ICD-10-CM

## 2023-11-30 DIAGNOSIS — G47.33 OBSTRUCTIVE SLEEP APNEA (ADULT) (PEDIATRIC): ICD-10-CM

## 2023-11-30 DIAGNOSIS — Z23 ENCOUNTER FOR IMMUNIZATION: ICD-10-CM

## 2023-11-30 DIAGNOSIS — B37.2 CANDIDIASIS OF SKIN AND NAIL: ICD-10-CM

## 2023-12-21 NOTE — PROGRESS NOTE ADULT - SUBJECTIVE AND OBJECTIVE BOX
[facility]ORY ENCOUNTER  INTERVENTIONAL PAIN MANAGEMENT FOLLOW-UP    CHIEF COMPLAINT:  No chief complaint on file.       PERTINENT CARE TEAM:  PCP: Kevin Gallardo MD    Orthopedics: Jacinto Parrish MD ([facility])    SUBJECTIVE:    Emily Birch is a 68 year old female seen today as a follow-up for R sided low back pain; s/p L4-5 ILESI (11/20/23) with ***% relief.     The pain is described as sharp, deep, achy in character.   It is rated 9/10, worsened with prolonged standing and walking and improved with leaning forward. She relies heavily on a grocery cart. She has no pain with sitting.     Pt has some residual RLE weakness from CVA, but otherwise denies numbness/tingling, clinically relevant bowel/bladder incontinence, saddle anesthesia, recent fever, infection, or antibiotic use.     PAIN COURSE AND PREVIOUS INTERVENTIONS:  Medications:  APAP, lidocaine ointment  Failed/prev: Prednisone, Tylenol #3 w/Codeine qty #120/30   Interventions:    11/20/23 L4-5 ILESI with ***% relief  10/19/23 L4-5 ILESI with 100% reliefx 2 days, return of pain thereafter.   Adjuvants:  Physical therapy    BLOOD THINNING MEDICATIONS:  Plavix 75 mg   ASA 81 mg     Pertinent Surgical History:  None    Mental Health/Substance Use History:  Anxiety  Depression    Pertinent Comorbidities:  COPD  Anemia of chronic disease  CVA  HLD  HTN  Aneurysm  Idiopathic pulmonary fibrosis  Osteoporosis  RLS  CKD 3      MEDICATIONS:    Current Outpatient Medications   Medication Sig Dispense Refill    Budeson-Glycopyrrol-Formoterol (Breztri Aerosphere) 160-9-4.8 MCG/ACT Aerosol Inhale 2 puffs into the lungs in the morning and 2 puffs in the evening.      albuterol 108 (90 Base) MCG/ACT inhaler Inhale 2 puffs into the lungs every 4 to 6 hours as needed.      clopidogrel (PLAVIX) 75 MG tablet Take 75 mg by mouth daily.      ferrous sulfate (FeroSul) 325 (65 FE) MG tablet Take 325 mg by mouth daily.      Saline Spray 0.65 % Solution Spray 1 spray in  each nostril 3 times daily as needed.      acetaminophen-codeine (TYLENOL NO.3) 300-30 MG per tablet Take 1 tablet by mouth 3 times daily as needed.      dilTIAZem (CARDIZEM CD) 120 MG 24 hr capsule Take 120 mg by mouth daily.      hydroCHLOROthiazide (HYDRODIURIL) 12.5 MG tablet Take 1 tablet by mouth daily.      losartan (COZAAR) 100 MG tablet Take 100 mg by mouth daily.      aspirin (ECOTRIN) 81 MG EC tablet Take 1 tablet by mouth daily.      simvastatin (ZOCOR) 10 MG tablet Take 10 mg by mouth daily.      alendronate (FOSAMAX) 35 MG tablet Take 1 tablet by mouth daily.      Vitamin D, Ergocalciferol, 1.25 mg (50,000 units) capsule Take 1 capsule by mouth 1 day a week.       No current facility-administered medications for this visit.       PROBLEM LIST:    Patient Active Problem List   Diagnosis    Stage 3 chronic kidney disease (CMD)    Restless legs    Osteoporosis    Nicotine dependence    Interstitial lung disease (CMD)    Idiopathic pulmonary fibrosis (CMD)    Hypertension    Hyperlipidemia    Aneurysm (CMD)    Hypoxia    Hemiparesis as late effect of cerebrovascular accident (CVA) (CMD)    Family history of malignant neoplasm of gastrointestinal tract    Feces contents abnormal    Electrocardiogram abnormal    Endometriosis    Dermal mycosis    Constipation    Chronic obstructive lung disease (CMD)    Cerebrovascular accident (CVA) (CMD)    Anxiety disorder    Anemia of chronic disease    COPD (chronic obstructive pulmonary disease) (CMD)    History of stroke with residual effects       HISTORIES:    ALLERGIES:   Allergen Reactions    Diphenhydramine HIVES and RASH    Penicillins RASH     Unknown reaction      Ace Inhibitors Cough       Past Medical History:   Diagnosis Date    Allergic rhinitis     Anemia of chronic disease     Aneurysm (CMD)     Chronic kidney disease, stage III (moderate) (CMD)     Chronic obstructive lung disease (CMD)     Dermal mycosis     Foot fracture, right     Hemiparesis as late  effect of cerebrovascular accident (CVA) (CMD)     Hernia of anterior abdominal wall     Hyperlipidemia     Hypertensive disorder     Idiopathic pulmonary fibrosis (CMD)     Interstitial lung disease (CMD)     Osteoarthritis of knee     Osteoporosis     Restless legs     Vitamin D deficiency         Past Surgical History:   Procedure Laterality Date    Mammo stereotactic biopsy right Right 09/15/2023    Calcs    Total knee replacement  2011        Social History     Socioeconomic History    Marital status: Single     Spouse name: Not on file    Number of children: Not on file    Years of education: Not on file    Highest education level: Not on file   Occupational History    Not on file   Tobacco Use    Smoking status: Not on file    Smokeless tobacco: Not on file   Substance and Sexual Activity    Alcohol use: Not on file    Drug use: Not on file    Sexual activity: Not on file   Other Topics Concern    Not on file   Social History Narrative    Not on file     Social Determinants of Health     Financial Resource Strain: Not on file   Food Insecurity: Not on file   Transportation Needs: Not on file   Physical Activity: Not on file   Stress: Not on file   Social Connections: Not on file   Interpersonal Safety: Not on file       I have reviewed the family history and social history as listed in the medical record as obtained by my nursing staff and support staff and agree with their documentation.  Outside health care facility medical records from [facility] were reviewed.    I have reviewed the patient's pertinent medical records.    REVIEW OF SYSTEMS:   Reviewed. As per RN (or MA) note and my HPI above.      OBJECTIVE:    PHYSICAL EXAMINATION:   Vitals:   There were no vitals taken for this visit.   Constitutional: well-developed, well-nourished female in no acute distress. Gait is without antalgia, without ataxia.   Head: normocephalic, atraumatic  ENT: Conjunctiva non-injected  Skin: Warm,  dry, intact without rash or lesion.  Psych:  The patient's mood is normal, and appropriate for the circumstances.  Cardiovascular: well-perfused extremities, no peripheral edema  Pulmonary:  Non-labored respirations, no stridor noted  Abdomen: Non-distended  Neurologic: Coordination intact, Facial nerves intact.  Musculoskeletal:   ***  MOTOR EXAMINATION:  LOWER EXTREMITY      RIGHT LEFT   Iliopsoas -5/5 5/5   Quadriceps -5/5 5/5   Hamstrings -5/5 5/5   Foot Dorsiflexion 5/5 5/5   Extensor hallucis longus 5/5 5/5   Gastrocnemius 5/5 5/5     No abnormal muscle tone, movements, or tremors noted. No muscular atrophy.    Sensation  Lower Extremity             Right Left   L1 Inguinal ligament intact intact   L2 Medial thigh/ Groin intact intact   L3 Anterior thigh intact intact   L4 Lateral thigh/Medial foot intact intact   L5 Buttock/Web space big toe/2nd toe intact intact   S1 Lateral foot intact intact       LUMBAR SPINE:    The thoracolumbar spine has a normal alignment      TENDERNESS    Lumbar paraspinal musculature nontender  bilaterally   Lumbar Facet Joints tender   Sacroiliac joints tender right     ROM: limited extension, flexion, rotation, right lateral bend, left lateral bend    Rotation:  70% bilaterally  Pain with flexion: positive  Pain with extension: positive      TESTS PERFORMED:   Lumbar facet loading: positive  Seated slump: negative bilaterally        LABORATORY DATA:    No results found for: \"PLT\"  No results found for: \"INR\"  No results found for: \"GFRNA\"  No results found for: \"GFRA\"  No results found for: \"GFRESTIMATE\"  No results found for: \"HGBA1C\"  11/14/23 Cr 1.64    IMAGING STUDIES:    11/10/23 XR LUMBAR AP LAT FLEX EXT  FINDINGS/IMPRESSION: 5 nonrib-bearing lumbar-type vertebral bodies. Levocurvature. No dynamic  spondylolisthesis. Vertebral body heights are maintained. Similar moderate degenerative disc height loss at L4-L5 where there is discogenic sclerosis. Lower lumbar predominant  facet arthropathy. Aortoiliac atherosclerotic calcifications.    07/20/23- MRI Lumbar Spine wo contrast:  [facility]  FINDINGS:   Lumbar vertebral body heights are well-maintained. Disc desiccation and disc height loss at L4-L5 with Modic endplate edema. Disc desiccation at   L2-L3 and L3-L4. No significant spondylolisthesis. Conus terminates at the L1-L2 level. No abnormal distal cord signal.  Multiple round T2 hyperintense signal foci of the right kidney, likely cysts. Partially visualized round up to 2.9 cm T2 hyperintense and T1   hyperintense signal focus at the right adnexa.  Segmental analysis:   T12-L1: No significant disc bulge or protrusion. No significant central  canal or neural foraminal narrowing.   L1-L2: No significant disc bulge or protrusion. Mild facet degenerative changes. No significant central canal or neural foraminal narrowing.   L2-L3: Mild disc bulge. Mild facet degenerative changes. No significant central canal or neural foraminal narrowing.   L3-L4: Mild bilobed disc bulge. Mild facet degenerative changes. No significant central canal or neural foraminal narrowing.   L4-L5: Diffuse disc bulge. Endplate osteophytes. Moderate facet/ligament flavum degenerative hypertrophy. Mild central canal narrowing with mild bilateral lateral recess narrowing. Mild bilateral neural foraminal narrowing.   L5-S1: No significant disc bulge or protrusion. Mild to moderate facet degenerative hypertrophy. No significant central canal or neural foraminal narrowing.    IMPRESSION:  1. L4-L5 disc-osteophyte complex and moderate facet degenerative  changes. Mild central canal narrowing with mild bilateral lateral recess  narrowing. Mild bilateral neural foraminal narrowing. L4-5 Modic endplate edema.   2. Multilevel mild disc bulges and mild facet degenerative changes at  other levels. No significant central canal or neural foraminal narrowing at other levels.   3. Partially visualized round up to 2.9 cm T2  hyperintense and T1 hyperintense signal focus at the right adnexa. Finding may represent a complex cyst. Recommend further evaluation with pelvic ultrasound.       I personally reviewed the images pertinent to this patient's visit.     PDMP Reviewed      ASSESSMENT:    No diagnosis found.     Emily Birch is a 68 year old female with right sided low back pain consistent with spinal stenosis with neurogenic claudication; s/p L4-5 ILESI (11/20/23) with ***% relief.     PLAN:  RECOMMENDATIONS:  1) Medical Modalities:  -Continue APAP  -Continue lidocaine ointment   2) Interventional Modalities: Schedule repeat L4-5 ILESI as #3/3 until 10/19/24; may cont ASA hold plavix 5d (h/o stroke)  -Discussed L4-5 MILD (approved by Dr. Rico)  -She has no pain when seated but we may want to rule out possible facetogenic contributions  3) Behavioral Medicine Modalities: none  4) Other Modalities:   -Patient declines PT for low back due to concerns about productivity with current pain levels  -Continue Home Exercise Therapy  5) Imaging/Labs: none  6) Consults: none      No orders of the defined types were placed in this encounter.      No follow-ups on file.    Instructions provided as documented in the after visit summary.    The above recommendations were provided to the patient. Diagnosis, treatment options, risks, benefits, and alternatives were discussed, and all questions were answered to the patient's satisfaction. The patient expressed understanding of the diagnosis and plan for management and agreed with the plan of care.      Thank you for allowing me to take part in the care of Emily Birch    If you have any questions, please feel free to contact me.      Celina Fatima PA-C  Supervising physician:  Laura Rico MD  [facility]     questions, please feel free to contact me.      Celina Fatima PA-C  Supervising physician:  Laura Rico MD  [facility]     PROGRESS NOTE:     Patient is a 62y old  Male who presents with a chief complaint of Cellulitis (09 Nov 2022 09:07)      SUBJECTIVE / OVERNIGHT EVENTS:   No acute events overnight. Decreased erythema and swelling of LLE. Pt denies pain, fever, chills, CP, SOB, n/v. Appetite normal.     REVIEW OF SYSTEMS:    CONSTITUTIONAL: No weakness, fevers or chills  EYES/ENT: No visual changes;  No vertigo or throat pain   NECK: No pain or stiffness  RESPIRATORY: No cough, wheezing, hemoptysis; No shortness of breath  CARDIOVASCULAR: No chest pain or palpitations  GASTROINTESTINAL: No abdominal or epigastric pain. No nausea, vomiting, or hematemesis; No diarrhea or constipation. No melena or hematochezia.  GENITOURINARY: No dysuria, frequency or hematuria  NEUROLOGICAL: No numbness or weakness  SKIN: No itching, rashes    MEDICATIONS  (STANDING):  betamethasone valerate 0.1% Cream 1 Application(s) Topical daily  ceFAZolin   IVPB      ceFAZolin   IVPB 2000 milliGRAM(s) IV Intermittent every 8 hours  chlorhexidine 2% Cloths 1 Application(s) Topical daily  dextrose 5%. 1000 milliLiter(s) (50 mL/Hr) IV Continuous <Continuous>  dextrose 5%. 1000 milliLiter(s) (100 mL/Hr) IV Continuous <Continuous>  dextrose 50% Injectable 25 Gram(s) IV Push once  dextrose 50% Injectable 12.5 Gram(s) IV Push once  dextrose 50% Injectable 25 Gram(s) IV Push once  glucagon  Injectable 1 milliGRAM(s) IntraMuscular once  insulin lispro (ADMELOG) corrective regimen sliding scale   SubCutaneous three times a day before meals  insulin lispro (ADMELOG) corrective regimen sliding scale   SubCutaneous at bedtime  lisinopril 10 milliGRAM(s) Oral daily  simvastatin 20 milliGRAM(s) Oral at bedtime    MEDICATIONS  (PRN):  acetaminophen     Tablet .. 650 milliGRAM(s) Oral every 6 hours PRN Temp greater or equal to 38C (100.4F), Mild Pain (1 - 3)  dextrose Oral Gel 15 Gram(s) Oral once PRN Blood Glucose LESS THAN 70 milliGRAM(s)/deciliter      CAPILLARY BLOOD GLUCOSE      POCT Blood Glucose.: 123 mg/dL (09 Nov 2022 09:18)  POCT Blood Glucose.: 142 mg/dL (08 Nov 2022 21:59)  POCT Blood Glucose.: 163 mg/dL (08 Nov 2022 17:32)  POCT Blood Glucose.: 212 mg/dL (08 Nov 2022 16:37)  POCT Blood Glucose.: 138 mg/dL (08 Nov 2022 13:52)    I&O's Summary    08 Nov 2022 07:01  -  09 Nov 2022 07:00  --------------------------------------------------------  IN: 410 mL / OUT: 0 mL / NET: 410 mL        PHYSICAL EXAM:  Vital Signs Last 24 Hrs  T(C): 36.6 (09 Nov 2022 04:13), Max: 37.1 (08 Nov 2022 20:42)  T(F): 97.8 (09 Nov 2022 04:13), Max: 98.7 (08 Nov 2022 20:42)  HR: 99 (09 Nov 2022 04:13) (86 - 108)  BP: 98/63 (09 Nov 2022 04:13) (98/63 - 107/71)  BP(mean): --  RR: 18 (09 Nov 2022 04:13) (18 - 18)  SpO2: 99% (09 Nov 2022 04:13) (95% - 99%)    Parameters below as of 09 Nov 2022 04:13  Patient On (Oxygen Delivery Method): room air        CONSTITUTIONAL: NAD, well-developed  RESPIRATORY: Normal respiratory effort; lungs are clear to auscultation bilaterally  CARDIOVASCULAR: Regular rate and rhythm, normal S1 and S2, no murmur/rub/gallop; No lower extremity edema; Peripheral pulses are 2+ bilaterally  ABDOMEN: Nontender to palpation, normoactive bowel sounds, no rebound/guarding; No hepatosplenomegaly  MUSCLOSKELETAL: no clubbing or cyanosis of digits; no joint swelling or tenderness to palpation. mild erythema and swelling LLE  PSYCH: A+O to person, place, and time; affect appropriate  NEURO: Non-focal, no tremors  SKIN: No rashes    LABS:                        13.8   6.86  )-----------( 178      ( 09 Nov 2022 07:20 )             40.7     11-09    137  |  101  |  18  ----------------------------<  107<H>  4.5   |  22  |  0.89    Ca    8.9      09 Nov 2022 07:18  Phos  4.0     11-09  Mg     2.2     11-09                Culture - Abscess with Gram Stain (collected 07 Nov 2022 07:32)  Source: .Abscess left foot  Preliminary Report (08 Nov 2022 07:01):    No growth        RADIOLOGY & ADDITIONAL TESTS:  No new imaging or tests    COORDINATION OF CARE:  Care Discussed with Consultants/Other Providers [Y/N]:  Prior or Outpatient Records Reviewed [Y/N]:

## 2024-05-15 RX ORDER — METFORMIN HYDROCHLORIDE 500 MG/1
500 TABLET, COATED ORAL
Qty: 60 | Refills: 1 | Status: ACTIVE | COMMUNITY
Start: 2020-12-18 | End: 1900-01-01

## 2024-08-07 NOTE — ED ADULT TRIAGE NOTE - ACCOMPANIED BY
Spouse/Significant other [Gastric By-pass] : gastric by-pass [de-identified] : DOLV: 5/16/24 [de-identified] : 11/20/2023

## 2025-01-10 ENCOUNTER — APPOINTMENT (OUTPATIENT)
Dept: INTERNAL MEDICINE | Facility: CLINIC | Age: 65
End: 2025-01-10
Payer: MEDICARE

## 2025-01-10 ENCOUNTER — NON-APPOINTMENT (OUTPATIENT)
Age: 65
End: 2025-01-10

## 2025-01-10 ENCOUNTER — OUTPATIENT (OUTPATIENT)
Dept: OUTPATIENT SERVICES | Facility: HOSPITAL | Age: 65
LOS: 1 days | End: 2025-01-10
Payer: MEDICARE

## 2025-01-10 VITALS — SYSTOLIC BLOOD PRESSURE: 120 MMHG | DIASTOLIC BLOOD PRESSURE: 70 MMHG

## 2025-01-10 VITALS
BODY MASS INDEX: 30.53 KG/M2 | HEIGHT: 66 IN | SYSTOLIC BLOOD PRESSURE: 150 MMHG | WEIGHT: 190 LBS | OXYGEN SATURATION: 96 % | DIASTOLIC BLOOD PRESSURE: 100 MMHG | HEART RATE: 87 BPM

## 2025-01-10 DIAGNOSIS — I10 ESSENTIAL (PRIMARY) HYPERTENSION: ICD-10-CM

## 2025-01-10 DIAGNOSIS — I48.91 UNSPECIFIED ATRIAL FIBRILLATION: ICD-10-CM

## 2025-01-10 DIAGNOSIS — E78.5 HYPERLIPIDEMIA, UNSPECIFIED: ICD-10-CM

## 2025-01-10 DIAGNOSIS — E11.9 TYPE 2 DIABETES MELLITUS W/OUT COMPLICATIONS: ICD-10-CM

## 2025-01-10 PROCEDURE — G0439: CPT

## 2025-01-10 PROCEDURE — 90677 PCV20 VACCINE IM: CPT

## 2025-01-10 PROCEDURE — 99387 INIT PM E/M NEW PAT 65+ YRS: CPT | Mod: GC

## 2025-01-10 PROCEDURE — 99214 OFFICE O/P EST MOD 30 MIN: CPT | Mod: GC,25

## 2025-01-10 PROCEDURE — G0009: CPT

## 2025-01-10 RX ORDER — APIXABAN 5 MG/1
5 TABLET, FILM COATED ORAL TWICE DAILY
Qty: 180 | Refills: 0 | Status: ACTIVE | COMMUNITY
Start: 2025-01-10 | End: 1900-01-01

## 2025-01-22 DIAGNOSIS — I48.91 UNSPECIFIED ATRIAL FIBRILLATION: ICD-10-CM

## 2025-01-22 DIAGNOSIS — Z00.00 ENCOUNTER FOR GENERAL ADULT MEDICAL EXAMINATION WITHOUT ABNORMAL FINDINGS: ICD-10-CM

## 2025-01-22 DIAGNOSIS — E78.5 HYPERLIPIDEMIA, UNSPECIFIED: ICD-10-CM

## 2025-01-22 DIAGNOSIS — E11.9 TYPE 2 DIABETES MELLITUS WITHOUT COMPLICATIONS: ICD-10-CM

## 2025-01-22 DIAGNOSIS — Z23 ENCOUNTER FOR IMMUNIZATION: ICD-10-CM

## 2025-05-09 ENCOUNTER — APPOINTMENT (OUTPATIENT)
Dept: INTERNAL MEDICINE | Facility: CLINIC | Age: 65
End: 2025-05-09
Payer: MEDICARE

## 2025-05-09 ENCOUNTER — OUTPATIENT (OUTPATIENT)
Dept: OUTPATIENT SERVICES | Facility: HOSPITAL | Age: 65
LOS: 1 days | End: 2025-05-09
Payer: MEDICARE

## 2025-05-09 VITALS
BODY MASS INDEX: 31.66 KG/M2 | SYSTOLIC BLOOD PRESSURE: 142 MMHG | DIASTOLIC BLOOD PRESSURE: 88 MMHG | HEIGHT: 66 IN | OXYGEN SATURATION: 98 % | WEIGHT: 197 LBS | HEART RATE: 110 BPM

## 2025-05-09 VITALS — SYSTOLIC BLOOD PRESSURE: 138 MMHG | HEART RATE: 78 BPM | DIASTOLIC BLOOD PRESSURE: 76 MMHG

## 2025-05-09 DIAGNOSIS — I48.91 UNSPECIFIED ATRIAL FIBRILLATION: ICD-10-CM

## 2025-05-09 DIAGNOSIS — Z00.00 ENCOUNTER FOR GENERAL ADULT MEDICAL EXAMINATION W/OUT ABNORMAL FINDINGS: ICD-10-CM

## 2025-05-09 DIAGNOSIS — E11.9 TYPE 2 DIABETES MELLITUS W/OUT COMPLICATIONS: ICD-10-CM

## 2025-05-09 DIAGNOSIS — I10 ESSENTIAL (PRIMARY) HYPERTENSION: ICD-10-CM

## 2025-05-09 PROCEDURE — 80053 COMPREHEN METABOLIC PANEL: CPT

## 2025-05-09 PROCEDURE — 83735 ASSAY OF MAGNESIUM: CPT

## 2025-05-09 PROCEDURE — 84100 ASSAY OF PHOSPHORUS: CPT

## 2025-05-09 PROCEDURE — T1013: CPT

## 2025-05-09 PROCEDURE — 84443 ASSAY THYROID STIM HORMONE: CPT

## 2025-05-09 PROCEDURE — G0463: CPT

## 2025-05-09 PROCEDURE — 99214 OFFICE O/P EST MOD 30 MIN: CPT | Mod: GC

## 2025-05-12 DIAGNOSIS — E11.9 TYPE 2 DIABETES MELLITUS WITHOUT COMPLICATIONS: ICD-10-CM

## 2025-05-12 DIAGNOSIS — I48.91 UNSPECIFIED ATRIAL FIBRILLATION: ICD-10-CM

## 2025-05-12 LAB
ALBUMIN SERPL ELPH-MCNC: 4.6 G/DL
ALP BLD-CCNC: 130 U/L
ALT SERPL-CCNC: 32 U/L
ANION GAP SERPL CALC-SCNC: 15 MMOL/L
AST SERPL-CCNC: 28 U/L
BILIRUB SERPL-MCNC: 0.4 MG/DL
BUN SERPL-MCNC: 18 MG/DL
CALCIUM SERPL-MCNC: 10.1 MG/DL
CHLORIDE SERPL-SCNC: 100 MMOL/L
CO2 SERPL-SCNC: 20 MMOL/L
CREAT SERPL-MCNC: 0.92 MG/DL
EGFRCR SERPLBLD CKD-EPI 2021: 92 ML/MIN/1.73M2
GLUCOSE SERPL-MCNC: 145 MG/DL
MAGNESIUM SERPL-MCNC: 1.9 MG/DL
PHOSPHATE SERPL-MCNC: 3.2 MG/DL
POTASSIUM SERPL-SCNC: 4.3 MMOL/L
PROT SERPL-MCNC: 7.9 G/DL
SODIUM SERPL-SCNC: 135 MMOL/L
TSH SERPL-ACNC: 2.76 UIU/ML

## 2025-05-22 ENCOUNTER — NON-APPOINTMENT (OUTPATIENT)
Age: 65
End: 2025-05-22